# Patient Record
Sex: FEMALE | Race: WHITE | NOT HISPANIC OR LATINO | Employment: FULL TIME | URBAN - METROPOLITAN AREA
[De-identification: names, ages, dates, MRNs, and addresses within clinical notes are randomized per-mention and may not be internally consistent; named-entity substitution may affect disease eponyms.]

---

## 2023-10-26 ENCOUNTER — HOSPITAL ENCOUNTER (OUTPATIENT)
Dept: RADIOLOGY | Facility: HOSPITAL | Age: 18
Discharge: HOME | End: 2023-10-26
Attending: FAMILY MEDICINE
Payer: COMMERCIAL

## 2023-10-26 ENCOUNTER — OFFICE VISIT (OUTPATIENT)
Dept: INTERNAL MEDICINE | Facility: CLINIC | Age: 18
End: 2023-10-26
Payer: COMMERCIAL

## 2023-10-26 VITALS
HEART RATE: 95 BPM | DIASTOLIC BLOOD PRESSURE: 62 MMHG | HEIGHT: 67 IN | BODY MASS INDEX: 23.36 KG/M2 | TEMPERATURE: 98 F | SYSTOLIC BLOOD PRESSURE: 100 MMHG | WEIGHT: 148.81 LBS | OXYGEN SATURATION: 99 %

## 2023-10-26 DIAGNOSIS — G89.29 CHRONIC BACK PAIN, UNSPECIFIED BACK LOCATION, UNSPECIFIED BACK PAIN LATERALITY: ICD-10-CM

## 2023-10-26 DIAGNOSIS — M54.9 CHRONIC BACK PAIN, UNSPECIFIED BACK LOCATION, UNSPECIFIED BACK PAIN LATERALITY: ICD-10-CM

## 2023-10-26 DIAGNOSIS — E55.9 VITAMIN D DEFICIENCY: ICD-10-CM

## 2023-10-26 DIAGNOSIS — G89.29 CHRONIC BACK PAIN, UNSPECIFIED BACK LOCATION, UNSPECIFIED BACK PAIN LATERALITY: Primary | ICD-10-CM

## 2023-10-26 DIAGNOSIS — M79.2 NEURALGIA: ICD-10-CM

## 2023-10-26 DIAGNOSIS — M54.9 CHRONIC BACK PAIN, UNSPECIFIED BACK LOCATION, UNSPECIFIED BACK PAIN LATERALITY: Primary | ICD-10-CM

## 2023-10-26 DIAGNOSIS — R10.9 STOMACH CRAMPS: ICD-10-CM

## 2023-10-26 PROCEDURE — 72100 XR LUMBAR SPINE 2 OR 3 VIEWS: ICD-10-PCS | Mod: 26,,, | Performed by: RADIOLOGY

## 2023-10-26 PROCEDURE — 99204 PR OFFICE/OUTPT VISIT, NEW, LEVL IV, 45-59 MIN: ICD-10-PCS | Mod: S$GLB,,, | Performed by: FAMILY MEDICINE

## 2023-10-26 PROCEDURE — 1160F RVW MEDS BY RX/DR IN RCRD: CPT | Mod: CPTII,S$GLB,, | Performed by: FAMILY MEDICINE

## 2023-10-26 PROCEDURE — 72100 X-RAY EXAM L-S SPINE 2/3 VWS: CPT | Mod: 26,,, | Performed by: RADIOLOGY

## 2023-10-26 PROCEDURE — 72100 X-RAY EXAM L-S SPINE 2/3 VWS: CPT | Mod: TC,FY,PO

## 2023-10-26 PROCEDURE — 72040 X-RAY EXAM NECK SPINE 2-3 VW: CPT | Mod: 26,,, | Performed by: RADIOLOGY

## 2023-10-26 PROCEDURE — 72070 X-RAY EXAM THORAC SPINE 2VWS: CPT | Mod: 26,,, | Performed by: RADIOLOGY

## 2023-10-26 PROCEDURE — 72040 X-RAY EXAM NECK SPINE 2-3 VW: CPT | Mod: TC,FY,PO

## 2023-10-26 PROCEDURE — 1159F MED LIST DOCD IN RCRD: CPT | Mod: CPTII,S$GLB,, | Performed by: FAMILY MEDICINE

## 2023-10-26 PROCEDURE — 99204 OFFICE O/P NEW MOD 45 MIN: CPT | Mod: S$GLB,,, | Performed by: FAMILY MEDICINE

## 2023-10-26 PROCEDURE — 72070 X-RAY EXAM THORAC SPINE 2VWS: CPT | Mod: TC,FY,PO

## 2023-10-26 PROCEDURE — 99999 PR PBB SHADOW E&M-NEW PATIENT-LVL V: ICD-10-PCS | Mod: PBBFAC,,, | Performed by: FAMILY MEDICINE

## 2023-10-26 PROCEDURE — 3008F PR BODY MASS INDEX (BMI) DOCUMENTED: ICD-10-PCS | Mod: CPTII,S$GLB,, | Performed by: FAMILY MEDICINE

## 2023-10-26 PROCEDURE — 99999 PR PBB SHADOW E&M-NEW PATIENT-LVL V: CPT | Mod: PBBFAC,,, | Performed by: FAMILY MEDICINE

## 2023-10-26 PROCEDURE — 3078F PR MOST RECENT DIASTOLIC BLOOD PRESSURE < 80 MM HG: ICD-10-PCS | Mod: CPTII,S$GLB,, | Performed by: FAMILY MEDICINE

## 2023-10-26 PROCEDURE — 3078F DIAST BP <80 MM HG: CPT | Mod: CPTII,S$GLB,, | Performed by: FAMILY MEDICINE

## 2023-10-26 PROCEDURE — 3008F BODY MASS INDEX DOCD: CPT | Mod: CPTII,S$GLB,, | Performed by: FAMILY MEDICINE

## 2023-10-26 PROCEDURE — 3074F SYST BP LT 130 MM HG: CPT | Mod: CPTII,S$GLB,, | Performed by: FAMILY MEDICINE

## 2023-10-26 PROCEDURE — 3074F PR MOST RECENT SYSTOLIC BLOOD PRESSURE < 130 MM HG: ICD-10-PCS | Mod: CPTII,S$GLB,, | Performed by: FAMILY MEDICINE

## 2023-10-26 PROCEDURE — 1159F PR MEDICATION LIST DOCUMENTED IN MEDICAL RECORD: ICD-10-PCS | Mod: CPTII,S$GLB,, | Performed by: FAMILY MEDICINE

## 2023-10-26 PROCEDURE — 1160F PR REVIEW ALL MEDS BY PRESCRIBER/CLIN PHARMACIST DOCUMENTED: ICD-10-PCS | Mod: CPTII,S$GLB,, | Performed by: FAMILY MEDICINE

## 2023-10-26 PROCEDURE — 72040 XR CERVICAL SPINE 2 OR 3 VIEWS: ICD-10-PCS | Mod: 26,,, | Performed by: RADIOLOGY

## 2023-10-26 PROCEDURE — 72070 XR THORACIC SPINE AP LATERAL: ICD-10-PCS | Mod: 26,,, | Performed by: RADIOLOGY

## 2023-10-26 RX ORDER — LISDEXAMFETAMINE DIMESYLATE 40 MG/1
40 CAPSULE ORAL EVERY MORNING
COMMUNITY
Start: 2023-10-13

## 2023-10-26 RX ORDER — BUPROPION HYDROCHLORIDE 300 MG/1
300 TABLET ORAL DAILY
COMMUNITY

## 2023-10-26 RX ORDER — DICYCLOMINE HYDROCHLORIDE 20 MG/1
20 TABLET ORAL EVERY 6 HOURS PRN
Qty: 30 TABLET | Refills: 0 | Status: SHIPPED | OUTPATIENT
Start: 2023-10-26 | End: 2024-02-04

## 2023-10-26 RX ORDER — TRAZODONE HYDROCHLORIDE 50 MG/1
50 TABLET ORAL NIGHTLY
COMMUNITY

## 2023-10-26 RX ORDER — SERTRALINE HYDROCHLORIDE 100 MG/1
100 TABLET, FILM COATED ORAL DAILY
COMMUNITY

## 2023-10-26 RX ORDER — HYDROXYZINE PAMOATE 50 MG/1
50 CAPSULE ORAL 4 TIMES DAILY
COMMUNITY

## 2023-10-26 NOTE — PROGRESS NOTES
Subjective     Patient ID: Tsering Kan is a 18 y.o. adult.    Chief Complaint: Establish Care    Patient presents to establish care. Patient with complaints of tingling and numbness in legs and hands and frequent lower back pain with sitting for prolonged periods of time for several weeks. Patient states changing positions helps to relieve pain. Tingling in hand and toes and numbness to mid thigh and forearm to hand.  Patient also complains of achy stomach pain and problems with constipation and diarrhea.  Patient does drive 45 min to work and 35 min to home 5 days a week.  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 Patient reports history of vitamin d def.      Review of Systems   Constitutional: Negative.    HENT: Negative.     Eyes:  Negative for discharge and redness.   Respiratory: Negative.     Cardiovascular: Negative.  Negative for chest pain, palpitations and leg swelling.   Gastrointestinal:  Positive for abdominal pain, constipation and diarrhea.   Musculoskeletal:  Positive for back pain. Negative for arthralgias and gait problem.   Neurological:  Positive for numbness. Negative for coordination difficulties.   Psychiatric/Behavioral: Negative.            Objective     Physical Exam  Vitals and nursing note reviewed.   Constitutional:       Appearance: Normal appearance. Tsering Kan is normal weight.   HENT:      Head: Normocephalic and atraumatic.   Eyes:      Extraocular Movements: Extraocular movements intact.      Conjunctiva/sclera: Conjunctivae normal.   Cardiovascular:      Rate and Rhythm: Normal rate and regular rhythm.      Pulses: Normal pulses.      Heart sounds: Normal heart sounds.   Pulmonary:      Effort: Pulmonary effort is normal.      Breath sounds: Normal breath sounds.   Abdominal:      General: Abdomen is flat. Bowel sounds are normal.      Palpations: Abdomen is soft.   Musculoskeletal:         General: Normal range of motion.      Cervical back:  Normal range of motion and neck supple.   Skin:     General: Skin is warm and dry.   Neurological:      General: No focal deficit present.      Mental Status: Tsering Kan is alert and oriented to person, place, and time.   Psychiatric:         Mood and Affect: Mood normal.         Behavior: Behavior normal.            Assessment and Plan     1. Chronic back pain, unspecified back location, unspecified back pain laterality  Comments:  xrays to eval and start physical therapy to help with symptoms, also recommended wearing a supportive bra to help with pain  Orders:  -     X-Ray Lumbar Spine 2 Or 3 Views; Future; Expected date: 10/26/2023  -     X-Ray Cervical Spine 2 or 3 Views; Future; Expected date: 10/26/2023  -     Cancel: X-Ray Thoracic Spine 4 or more views; Future; Expected date: 10/26/2023  -     Ambulatory referral/consult to Physical/Occupational Therapy; Future; Expected date: 11/02/2023    2. Neuralgia  Comments:  will check labs to rule out def, recommend physical therapy to help with symptosm  Orders:  -     CBC W/ AUTO DIFFERENTIAL; Future; Expected date: 10/26/2023  -     COMPREHENSIVE METABOLIC PANEL; Future; Expected date: 10/26/2023  -     IRON AND TIBC; Future; Expected date: 10/26/2023  -     FERRITIN; Future; Expected date: 10/26/2023  -     VITAMIN B12; Future; Expected date: 10/26/2023  -     TSH; Future; Expected date: 10/26/2023  -     T4, free; Future; Expected date: 10/26/2023  -     Ambulatory referral/consult to Physical/Occupational Therapy; Future; Expected date: 11/02/2023    3. Vitamin D deficiency  Comments:  will check levels to assess need for supplementation  Orders:  -     Calcitriol; Future; Expected date: 10/26/2023    4. Stomach cramps  Comments:  dicyclomine prn for cramping pain  Orders:  -     dicyclomine (BENTYL) 20 mg tablet; Take 1 tablet (20 mg total) by mouth every 6 (six) hours as needed (stomach pain).  Dispense: 30 tablet; Refill: 0                 Follow  up in about 6 weeks (around 12/7/2023).

## 2023-11-13 ENCOUNTER — CLINICAL SUPPORT (OUTPATIENT)
Dept: REHABILITATION | Facility: HOSPITAL | Age: 18
End: 2023-11-13
Payer: COMMERCIAL

## 2023-11-13 DIAGNOSIS — M54.9 CHRONIC BACK PAIN, UNSPECIFIED BACK LOCATION, UNSPECIFIED BACK PAIN LATERALITY: ICD-10-CM

## 2023-11-13 DIAGNOSIS — M79.2 NEURALGIA: ICD-10-CM

## 2023-11-13 DIAGNOSIS — G89.29 CHRONIC BACK PAIN, UNSPECIFIED BACK LOCATION, UNSPECIFIED BACK PAIN LATERALITY: ICD-10-CM

## 2023-11-13 DIAGNOSIS — R29.898 WEAKNESS OF BOTH LOWER EXTREMITIES: Primary | ICD-10-CM

## 2023-11-13 PROCEDURE — 97161 PT EVAL LOW COMPLEX 20 MIN: CPT | Mod: PN

## 2023-11-13 PROCEDURE — 97110 THERAPEUTIC EXERCISES: CPT | Mod: PN

## 2023-11-13 NOTE — PLAN OF CARE
[facility]  Physical Therapy Initial Evaluation     Date: 11/13/2023   Name: Tsering Sun [facility] Number: [id number]    Therapy Diagnosis:   Encounter Diagnoses   Name Primary?    Chronic back pain, unspecified back location, unspecified back pain laterality     Neuralgia     Weakness of both lower extremities Yes     Physician: Urban Vickers*    Physician Orders: PT Eval and Treat  Medical Diagnosis from Referral:   M54.9,G89.29 (ICD-10-CM) - Chronic back pain, unspecified back location, unspecified back pain laterality   M79.2 (ICD-10-CM) - Neuralgia   Evaluation Date: 11/13/2023  Authorization Period Expiration: 10/25/2024  Plan of Care Expiration: 1/13/2024  Progress Note Due: 12/13/2023  Visit # / Visits authorized: 1/1   FOTO: 1/3     Time In: 8:00am  Time Out: 8:45am  Total Appointment Time (timed & untimed codes): 45 minutes    Precautions: Standard    Surgery: None    SUBJECTIVE   Date of onset: 3 months   History of current condition - Tsering reports: Having low back pain for around 3 months. Patient reports that the commute to work is about an hour and that is around when the back started to act up. Patient reports the back pain is worse when bending down to lift things up. Patient reports any position for extended periods usually flare up the back. Patient reports the symptoms will go down both legs.     Imaging, x-ray: reviewed     Prior Therapy: none  Social History: lives with their family  Occupation: [employer]; [employer]   Prior Level of Function: walking, climbing, lifting  Current Level of Function: light lifting     Pain:  Current 2/10, worst 7/10, best 2/10   Location: bilateral low back and down into legs   Description: Aching, Dull, and Shooting  Aggravating Factors: Sitting, Standing, Bending, Walking, and Lifting  Easing Factors: rest    Pts goals: decrease pain      Medical History:   Past Medical History:   Diagnosis Date    ADHD  (attention deficit hyperactivity disorder)     Anxiety     Depression      Surgical History:   Tsering Kan  has no past surgical history on file.    Medications:   Tsering has a current medication list which includes the following prescription(s): bupropion, dicyclomine, hydroxyzine pamoate, lisdexamfetamine, sertraline, and trazodone.    Allergies:   Review of patient's allergies indicates:  No Known Allergies     OBJECTIVE     Sensation:  Sensation is intact to light touch    (WFL: Within Functional Limits)     ROM   Right (degrees) Left (degrees)   Lumbar Flexion  100% 100%   Lumbar Extension 100% pain 100% pain   Lumbar Sidebending 100% 100%   Lumbar Rotation 100% 100% pain      Hip Flexion (125) WFL WFL   Hip Extension (15) WFL WFL   Hip Internal Rotation (45) WFL WFL   Hip External Rotation (45) WFL WFL   Hip Abduction (45) WFL WFL     Joint Mobility   Right Left    Lumbar PA Glide Normal Normal   Lumbar Rotation Normal Normal   Hip Distraction Normal Normal   Hip Inferior Glide Normal Normal   Hip Medial Glide Normal Normal   Hip Lateral Glide Normal Normal   Posterior Innominate Rotation Normal Normal   Anterior Innominate Rotation Normal Normal   Innominate IR  Normal Normal   Innominate ER  Normal Normal     Strength   Right    Left   Gluteus Chuy 4/5 4/5   Gluteus Medius 4-/5 4-/5   Hip Adductors 4/5 4/5   Psoas 4/5 4/5   Quadriceps 4+/5 4+/5   Hamstrings 4+/5 4+/5   Anterior Tibialis 5/5 5/5   Gastroc/Soleus 5/5 5/5   Transverse Abdominis good     Special Tests:   Test Right Left   SLR Test positive positive   SLUMP  positive positive   ASIS Compression negative negative   ASIS Distraction negative negative   Posterior Shear Gap negative negative     Muscle Length: decreased hamstring length bilaterally     Palpation: tender at bilateral lumbar erectors and bilateral piriformis      Movement Analysis:   Test Right Left   Squat Pain   Heel Tap/Step Up Pain Pain   Single Leg Balance Pain Pain      Gait Analysis: The patient ambulated with the use of none and presents with the following gait abnormalities:  normal    Other: Pt presents with postural abnormalities which include: forward head and rounded shoulders     Function:     Intake Outcome Measure for FOTO Lumbar Spine Survey    Therapist reviewed FOTO scores for Tsering Kan on 11/13/2023.   FOTO documents entered into [employer] - see Media section.    Intake Score: 54%       TREATMENT     Total Treatment time (time-based codes) separate from Evaluation: 15 minutes     Tsering received the treatments listed below:      therapeutic exercises to develop strength, endurance, ROM, flexibility, posture, and core stabilization for 15 minutes including:  HEP education     manual therapy techniques: Joint mobilizations, Myofacial release, and Soft tissue Mobilization were applied to the: low back for 0 minutes, including:    neuromuscular re-education activities to improve: Balance, Coordination, Proprioception, and Posture for 0 minutes. The following activities were included:    therapeutic activities to improve functional performance for 0 minutes, including:    PATIENT EDUCATION AND HOME EXERCISES     Education provided:   - HEP education     Written Home Exercises Provided: yes. Exercises were reviewed and Tsering was able to demonstrate them prior to the end of the session.  Tsering demonstrated good  understanding of the education provided. See EMR under Patient Instructions for exercises provided during therapy sessions.    ASSESSMENT   Tsering is a 18 y.o. adult referred to outpatient Physical Therapy with a medical diagnosis of   M54.9,G89.29 (ICD-10-CM) - Chronic back pain, unspecified back location, unspecified back pain laterality   M79.2 (ICD-10-CM) - Neuralgia   . The patient presents with impairments which include impairments list: ROM, strength, endurance, muscle length, balance, posture, core strength and stability, functional movement patterns,  and coordination.  These impairments are limiting patient's ability to stand for long periods, sit for long periods, drive long distances for commute, walk around campus, go up and down stairs, and perform heavy tasks at work and home. Pt prognosis is Excellent due to personal factors and co-morbidities listed below. Pt will benefit from skilled outpatient Physical Therapy to address the deficits stated above and in the chart below, provide pt/family education, and to maximize pt's level of independence.     Plan of care discussed with patient: Yes  Pt's spiritual, cultural and educational needs considered and patient is agreeable to the plan of care and goals as stated below:     Anticipated Barriers for therapy: school and work schedule     Medical Necessity is demonstrated by the following  History  Co-morbidities and personal factors that may impact the plan of care [x] LOW: no personal factors / co-morbidities  [] MODERATE: 1-2 personal factors / co-morbidities  [] HIGH: 3+ personal factors / co-morbidities    Moderate / High Support Documentation:   Co-morbidities affecting plan of care: excessive commute time/distance and young age    Personal Factors:   age  coping style     Examination  Body Structures and Functions, activity limitations and participation restrictions that may impact the plan of care [x] LOW: addressing 1-2 elements  [] MODERATE: 3+ elements  [] HIGH: 4+ elements (please support below)    Moderate / High Support Documentation:     Participation Restrictions:   ADLs  Work  Exercise    Mobility  lifting and carrying objects  walking    Self care  no deficits    Life Areas  school education  employment    Community and Social Life  community life  recreation and leisure     Clinical Presentation [x] LOW: stable  [] MODERATE: Evolving  [] HIGH: Unstable     Decision Making/ Complexity Score: low       Goals:  Short Term Goals: 4 weeks   1. Recent signs and systems trend is improving in order to  progress towards LTG's.  2. Patient will improve all lumbar ROM to 100% in order to work fully.  3. Patient will be independent with HEP in order to further progress and return to maximal function.  4. Pain rating at Worst: 3/10 in order to progress towards increased independence with activity.    Long Term Goals: 8 weeks   1. Patient will improve psoas strength to 5/5 in order to walk long distances on campus.  2. Patient will improve glute med strength to 4+/5 in order to go up and down stairs.  3. Patient will self report improvement to 73% on the FOTO Low Back Survey.     PLAN   Plan of care Certification: 11/13/2023 to 1/13/2024.    Outpatient Physical Therapy 2 times weekly for 8 weeks to include the following interventions: Gait Training, Manual Therapy, Moist Heat/ Ice, Neuromuscular Re-ed, Patient Education, Self Care, Therapeutic Activities, Therapeutic Exercise, and Functional Dry Needling .     Jersey Springer, PT, DPT    I CERTIFY THE NEED FOR THESE SERVICES FURNISHED UNDER THIS PLAN OF TREATMENT AND WHILE UNDER MY CARE   Physician's comments:     Physician's Signature: ___________________________________________________

## 2023-11-20 ENCOUNTER — CLINICAL SUPPORT (OUTPATIENT)
Dept: REHABILITATION | Facility: HOSPITAL | Age: 18
End: 2023-11-20
Payer: COMMERCIAL

## 2023-11-20 DIAGNOSIS — G89.29 CHRONIC BILATERAL LOW BACK PAIN, UNSPECIFIED WHETHER SCIATICA PRESENT: Primary | ICD-10-CM

## 2023-11-20 DIAGNOSIS — M54.50 CHRONIC BILATERAL LOW BACK PAIN, UNSPECIFIED WHETHER SCIATICA PRESENT: Primary | ICD-10-CM

## 2023-11-20 DIAGNOSIS — R29.898 WEAKNESS OF BOTH LOWER EXTREMITIES: ICD-10-CM

## 2023-11-20 PROCEDURE — 97140 MANUAL THERAPY 1/> REGIONS: CPT | Mod: PN

## 2023-11-20 PROCEDURE — 97530 THERAPEUTIC ACTIVITIES: CPT | Mod: PN

## 2023-11-20 PROCEDURE — 97110 THERAPEUTIC EXERCISES: CPT | Mod: PN

## 2023-11-20 PROCEDURE — 97112 NEUROMUSCULAR REEDUCATION: CPT | Mod: PN

## 2023-11-20 NOTE — PROGRESS NOTES
[facility]   Physical Therapy Treatment Note     Name: Tsering Kan  Clinic Number: [id number]    Therapy Diagnosis:   Encounter Diagnoses   Name Primary?    Chronic bilateral low back pain, unspecified whether sciatica present Yes    Weakness of both lower extremities      Physician: Urban Vickers*    Visit Date: 11/20/2023    Physician Orders: PT Eval and Treat  Medical Diagnosis from Referral:   M54.9,G89.29 (ICD-10-CM) - Chronic back pain, unspecified back location, unspecified back pain laterality   M79.2 (ICD-10-CM) - Neuralgia   Evaluation Date: 11/13/2023  Authorization Period Expiration: 12/31/2023  Plan of Care Expiration: 1/13/2024  Progress Note Due: 12/13/2023  Visit # / Visits authorized: 1/20 (+1 eval)  FOTO: 1/3     Time In: 1:30pm  Time Out: 2:30pm  Total Billable Time: 50 minutes    Precautions: Standard    SUBJECTIVE     Pt reports: Feeling okay today. HEP was okay so far.   Tsering Kan was compliant with home exercise program.  Response to previous treatment: good  Functional change: none noted     Pain:  Current 2/10, worst 7/10, best 2/10   Location: bilateral low back and down into legs     OBJECTIVE     Objective Measures updated at progress report unless specified.     TREATMENT     Tsering received the treatments listed below:      therapeutic exercises to develop strength, endurance, ROM, flexibility, posture, and core stabilization for 15 minutes including:  Bike 5 minutes  Piriformis Stretch [id number]  LTR 30x  Lumbar flexion stretch with swiss ball [id number]     manual therapy techniques: Joint mobilizations, Myofacial release, and Soft tissue Mobilization were applied to the: low back for 10 minutes, including:  Cupping to Lumbar erectors      neuromuscular re-education activities to improve: Balance, Coordination, Proprioception, and Posture for 15 minutes. The following activities were included:  Bridges 3x10  Sidelying Clams  3x10  Reverse Clams with ball 3x10  Bird/Dog 2x10  Hamstring Curls with swiss ball 30x     therapeutic activities to improve functional performance for 10 minutes, including:  Lateral Heel Taps 3x10  Single Leg RDLs to cone 3x10     PATIENT EDUCATION AND HOME EXERCISES      Education provided:   - HEP education      Written Home Exercises Provided: yes. Exercises were reviewed and Tsering was able to demonstrate them prior to the end of the session.  Tsering demonstrated good  understanding of the education provided. See EMR under Patient Instructions for exercises provided during therapy sessions.    ASSESSMENT     New movements were added today to work on hip and core control. Cupping and soft tissue were performed to the lumbar spine tro decrease tension. Overall, patient tolerated session well.      Tsering Is progressing well towards Tsering Kan's goals.   Pt prognosis is Good.     Pt will continue to benefit from skilled outpatient physical therapy to address the deficits listed in the problem list box on initial evaluation, provide pt/family education and to maximize pt's level of independence in the home and community environment.     Pt's spiritual, cultural and educational needs considered and pt agreeable to plan of care and goals.     Anticipated barriers to physical therapy: school and work schedule      Goals:  Short Term Goals: 4 weeks   1. Recent signs and systems trend is improving in order to progress towards LTG's.  2. Patient will improve all lumbar ROM to 100% in order to work fully.  3. Patient will be independent with HEP in order to further progress and return to maximal function.  4. Pain rating at Worst: 3/10 in order to progress towards increased independence with activity.     Long Term Goals: 8 weeks   1. Patient will improve psoas strength to 5/5 in order to walk long distances on campus.  2. Patient will improve glute med strength to 4+/5 in order to go up and down stairs.  3.  Patient will self report improvement to 73% on the FOTO Low Back Survey.     PLAN     Continue with physical therapy as planned.     Plan of care Certification: 11/13/2023 to 1/13/2024.     Jersey Springer, PT, DPT

## 2023-11-27 ENCOUNTER — CLINICAL SUPPORT (OUTPATIENT)
Dept: REHABILITATION | Facility: HOSPITAL | Age: 18
End: 2023-11-27
Payer: COMMERCIAL

## 2023-11-27 DIAGNOSIS — M54.50 CHRONIC BILATERAL LOW BACK PAIN, UNSPECIFIED WHETHER SCIATICA PRESENT: Primary | ICD-10-CM

## 2023-11-27 DIAGNOSIS — G89.29 CHRONIC BILATERAL LOW BACK PAIN, UNSPECIFIED WHETHER SCIATICA PRESENT: Primary | ICD-10-CM

## 2023-11-27 DIAGNOSIS — R29.898 WEAKNESS OF BOTH LOWER EXTREMITIES: ICD-10-CM

## 2023-11-27 PROCEDURE — 97112 NEUROMUSCULAR REEDUCATION: CPT | Mod: PN

## 2023-11-27 PROCEDURE — 97530 THERAPEUTIC ACTIVITIES: CPT | Mod: PN

## 2023-11-27 PROCEDURE — 97110 THERAPEUTIC EXERCISES: CPT | Mod: PN

## 2023-11-27 NOTE — PROGRESS NOTES
[facility]   Physical Therapy Treatment Note     Name: Tsering Kan  Clinic Number: [id number]    Therapy Diagnosis:   Encounter Diagnoses   Name Primary?    Chronic bilateral low back pain, unspecified whether sciatica present Yes    Weakness of both lower extremities      Physician: Urban Vickers*    Visit Date: 11/27/2023    Physician Orders: PT Eval and Treat  Medical Diagnosis from Referral:   M54.9,G89.29 (ICD-10-CM) - Chronic back pain, unspecified back location, unspecified back pain laterality   M79.2 (ICD-10-CM) - Neuralgia   Evaluation Date: 11/13/2023  Authorization Period Expiration: 12/31/2023  Plan of Care Expiration: 1/13/2024  Progress Note Due: 12/13/2023  Visit # / Visits authorized: 2/20 (+1 eval)  FOTO: 1/3     Time In: 1:30pm  Time Out: 2:20pm  Total Billable Time: 50 minutes    Precautions: Standard    SUBJECTIVE     Pt reports: The back felt a little looser after last session and feels alright today.   Tsering Kan was compliant with home exercise program.  Response to previous treatment: good  Functional change: none noted     Pain:  Current 2/10, worst 7/10, best 2/10   Location: bilateral low back and down into legs     OBJECTIVE     Objective Measures updated at progress report unless specified.     TREATMENT     Tsering received the treatments listed below:      therapeutic exercises to develop strength, endurance, ROM, flexibility, posture, and core stabilization for 15 minutes including:  Bike 5 minutes  Piriformis Stretch [id number]  LTR 30x  Lumbar flexion stretch with swiss ball [id number]     manual therapy techniques: Joint mobilizations, Myofacial release, and Soft tissue Mobilization were applied to the: low back for 0 minutes, including:  Cupping to Lumbar erectors      neuromuscular re-education activities to improve: Balance, Coordination, Proprioception, and Posture for 25 minutes. The following activities were included:  Bridges  3x10  Sidelying Clams 3x10  Reverse Clams with ball 3x10  Bird/Dog 2x10  Hamstring Curls with swiss ball 30x  Hip 3 way on airex 10x each   Trunk Rot Isometric 3x5 7#      therapeutic activities to improve functional performance for 10 minutes, including:  Lateral Heel Taps 3x10  Single Leg RDLs to cone 3x10     PATIENT EDUCATION AND HOME EXERCISES      Education provided:   - HEP education      Written Home Exercises Provided: yes. Exercises were reviewed and Tsering was able to demonstrate them prior to the end of the session.  Tsering demonstrated good  understanding of the education provided. See EMR under Patient Instructions for exercises provided during therapy sessions.    ASSESSMENT     Hip 3 way was added today to work on hip control. Overall, patient tolerated session well. Tsering was advised to keep working on HEP daily.     Tsering Is progressing well towards Tsering Kan's goals.   Pt prognosis is Good.     Pt will continue to benefit from skilled outpatient physical therapy to address the deficits listed in the problem list box on initial evaluation, provide pt/family education and to maximize pt's level of independence in the home and community environment.     Pt's spiritual, cultural and educational needs considered and pt agreeable to plan of care and goals.     Anticipated barriers to physical therapy: school and work schedule      Goals:  Short Term Goals: 4 weeks   1. Recent signs and systems trend is improving in order to progress towards LTG's.  2. Patient will improve all lumbar ROM to 100% in order to work fully.  3. Patient will be independent with HEP in order to further progress and return to maximal function.  4. Pain rating at Worst: 3/10 in order to progress towards increased independence with activity.     Long Term Goals: 8 weeks   1. Patient will improve psoas strength to 5/5 in order to walk long distances on campus.  2. Patient will improve glute med strength to 4+/5 in order to  go up and down stairs.  3. Patient will self report improvement to 73% on the FOTO Low Back Survey.     PLAN     Continue with physical therapy as planned.     Plan of care Certification: 11/13/2023 to 1/13/2024.     Jersey Springer, PT, DPT

## 2023-11-30 ENCOUNTER — CLINICAL SUPPORT (OUTPATIENT)
Dept: REHABILITATION | Facility: HOSPITAL | Age: 18
End: 2023-11-30
Payer: COMMERCIAL

## 2023-11-30 DIAGNOSIS — R29.898 WEAKNESS OF BOTH LOWER EXTREMITIES: Primary | ICD-10-CM

## 2023-11-30 PROCEDURE — 97110 THERAPEUTIC EXERCISES: CPT | Mod: PN,CQ

## 2023-11-30 PROCEDURE — 97112 NEUROMUSCULAR REEDUCATION: CPT | Mod: PN,CQ

## 2023-11-30 PROCEDURE — 97530 THERAPEUTIC ACTIVITIES: CPT | Mod: PN,CQ

## 2023-11-30 NOTE — PROGRESS NOTES
[facility]   Physical Therapy Treatment Note     Name: Tsering Kan  Clinic Number: [id number]    Therapy Diagnosis:   Encounter Diagnosis   Name Primary?    Weakness of both lower extremities Yes     Physician: Urban Vickers*    Visit Date: 11/30/2023    Physician Orders: PT Eval and Treat  Medical Diagnosis from Referral:   M54.9,G89.29 (ICD-10-CM) - Chronic back pain, unspecified back location, unspecified back pain laterality   M79.2 (ICD-10-CM) - Neuralgia   Evaluation Date: 11/13/2023  Authorization Period Expiration: 12/31/2023  Plan of Care Expiration: 1/13/2024  Progress Note Due: 12/13/2023  Visit # / Visits authorized: 3/20(+1 eval)  FOTO: 1/3     Time In: 1:00pm  Time Out: 1:55pm  Total Billable Time: 55 minutes    Precautions: Standard    SUBJECTIVE     Pt reports: feeling okay lately. States pain after sitting in class and then having to drive for the hour commute.   Tsering Kan was compliant with home exercise program.  Response to previous treatment: good  Functional change: none noted     Pain:  Current 2/10, worst 7/10, best 2/10   Location: bilateral low back and down into legs     OBJECTIVE     Objective Measures updated at progress report unless specified.     TREATMENT     Tsering received the treatments listed below:      therapeutic exercises to develop strength, endurance, ROM, flexibility, posture, and core stabilization for 15 minutes including:  Bike 5 minutes  Piriformis Stretch [id number]  LTR 30x  Lumbar flexion stretch with swiss ball [id number]     manual therapy techniques: Joint mobilizations, Myofacial release, and Soft tissue Mobilization were applied to the: low back for 0 minutes, including:  Cupping to Lumbar erectors      neuromuscular re-education activities to improve: Balance, Coordination, Proprioception, and Posture for 30 minutes. The following activities were included:  Bridges 3x10  Sidelying Clams 3x10  Reverse Clams with  ball 3x10  Bird/Dog 3x10  Hamstring Curls with swiss ball 30x  Hip 3 way on airex 10x each   Trunk Rot Isometric 3x5 7#      therapeutic activities to improve functional performance for 10 minutes, including:  Lateral Heel Taps 3x10  Single Leg RDLs to cone 3x10     PATIENT EDUCATION AND HOME EXERCISES      Education provided:   - HEP education      Written Home Exercises Provided: yes. Exercises were reviewed and Tsering was able to demonstrate them prior to the end of the session.  Tsering demonstrated good  understanding of the education provided. See EMR under Patient Instructions for exercises provided during therapy sessions.    ASSESSMENT     Heavy verbal cues for slow and controlled motions throughout session today. Patient with difficulty with static stability exercises, good awareness with necessary core control. Patient left session with good fatigue and minimal soreness.     Tsering Is progressing well towards Tsering Kan's goals.   Pt prognosis is Good.     Pt will continue to benefit from skilled outpatient physical therapy to address the deficits listed in the problem list box on initial evaluation, provide pt/family education and to maximize pt's level of independence in the home and community environment.     Pt's spiritual, cultural and educational needs considered and pt agreeable to plan of care and goals.     Anticipated barriers to physical therapy: school and work schedule      Goals:  Short Term Goals: 4 weeks   1. Recent signs and systems trend is improving in order to progress towards LTG's.  2. Patient will improve all lumbar ROM to 100% in order to work fully.  3. Patient will be independent with HEP in order to further progress and return to maximal function.  4. Pain rating at Worst: 3/10 in order to progress towards increased independence with activity.     Long Term Goals: 8 weeks   1. Patient will improve psoas strength to 5/5 in order to walk long distances on campus.  2. Patient  will improve glute med strength to 4+/5 in order to go up and down stairs.  3. Patient will self report improvement to 73% on the FOTO Low Back Survey.     PLAN     Continue with physical therapy as planned.     Plan of care Certification: 11/13/2023 to 1/13/2024.     Erica Cohen, PTA

## 2023-12-04 ENCOUNTER — CLINICAL SUPPORT (OUTPATIENT)
Dept: REHABILITATION | Facility: HOSPITAL | Age: 18
End: 2023-12-04
Payer: COMMERCIAL

## 2023-12-04 DIAGNOSIS — R29.898 WEAKNESS OF BOTH LOWER EXTREMITIES: ICD-10-CM

## 2023-12-04 DIAGNOSIS — G89.29 CHRONIC BILATERAL LOW BACK PAIN, UNSPECIFIED WHETHER SCIATICA PRESENT: Primary | ICD-10-CM

## 2023-12-04 DIAGNOSIS — M54.50 CHRONIC BILATERAL LOW BACK PAIN, UNSPECIFIED WHETHER SCIATICA PRESENT: Primary | ICD-10-CM

## 2023-12-04 PROCEDURE — 97112 NEUROMUSCULAR REEDUCATION: CPT | Mod: PN,CQ

## 2023-12-04 PROCEDURE — 97140 MANUAL THERAPY 1/> REGIONS: CPT | Mod: PN,CQ

## 2023-12-04 PROCEDURE — 97110 THERAPEUTIC EXERCISES: CPT | Mod: PN,CQ

## 2023-12-04 NOTE — PROGRESS NOTES
[facility]   Physical Therapy Treatment Note     Name: Tsering Kan  Clinic Number: [id number]    Therapy Diagnosis:   Encounter Diagnoses   Name Primary?    Chronic bilateral low back pain, unspecified whether sciatica present Yes    Weakness of both lower extremities      Physician: Urban Vickers*    Visit Date: 12/4/2023    Physician Orders: PT Eval and Treat  Medical Diagnosis from Referral:   M54.9,G89.29 (ICD-10-CM) - Chronic back pain, unspecified back location, unspecified back pain laterality   M79.2 (ICD-10-CM) - Neuralgia   Evaluation Date: 11/13/2023  Authorization Period Expiration: 12/31/2023  Plan of Care Expiration: 1/13/2024  Progress Note Due: 12/13/2023  Visit # / Visits authorized: 4/20(+1 eval)  FOTO: 1/3     Time In: 1:30pm  Time Out: 2:15pm  Total Billable Time: 45 minutes    Precautions: Standard    SUBJECTIVE     Pt reports: hurts more when she is driving or sitting for a while.   Tsering Kan was compliant with home exercise program.  Response to previous treatment: good  Functional change: none noted     Pain:  Current 2/10, worst 7/10, best 2/10   Location: bilateral low back and down into legs     OBJECTIVE     Objective Measures updated at progress report unless specified.     TREATMENT     Tsering received the treatments listed below:      therapeutic exercises to develop strength, endurance, ROM, flexibility, posture, and core stabilization for 15 minutes including:  Bike 5 minutes  Piriformis Stretch [id number]  LTR 30x  Lumbar flexion stretch with swiss ball [id number]     manual therapy techniques: Joint mobilizations, Myofacial release, and Soft tissue Mobilization were applied to the: low back for 10 minutes, including:  Sciatic nerve stretch and glide  Cupping to Lumbar erectors      neuromuscular re-education activities to improve: Balance, Coordination, Proprioception, and Posture for 20 minutes. The following activities were  included:  Bridges 3x10  Sidelying Clams 3x10  Reverse Clams with ball 3x10  Bird/Dog 3x10  Paloff Press, 7#, 2x10  Resisted side stepping w/ paloff press, 7#, 3 laps  Hamstring Curls with swiss ball 30x  Hip 3 way on airex 10x each   Trunk Rot Isometric 3x5 7#      therapeutic activities to improve functional performance for 10 minutes, including:  Lateral Heel Taps 3x10  Single Leg RDLs to cone 3x10     PATIENT EDUCATION AND HOME EXERCISES      Education provided:   - HEP education      Written Home Exercises Provided: yes. Exercises were reviewed and Tsering was able to demonstrate them prior to the end of the session.  Tsering demonstrated good  understanding of the education provided. See EMR under Patient Instructions for exercises provided during therapy sessions.    ASSESSMENT     Needs much more lumbar stability strength. Continued with strengthening and ROM interventions. Added in sciatic nerve stretch and glides.     Tsering Is progressing well towards Tsering Kan's goals.   Pt prognosis is Good.     Pt will continue to benefit from skilled outpatient physical therapy to address the deficits listed in the problem list box on initial evaluation, provide pt/family education and to maximize pt's level of independence in the home and community environment.     Pt's spiritual, cultural and educational needs considered and pt agreeable to plan of care and goals.     Anticipated barriers to physical therapy: school and work schedule      Goals:  Short Term Goals: 4 weeks   1. Recent signs and systems trend is improving in order to progress towards LTG's.  2. Patient will improve all lumbar ROM to 100% in order to work fully.  3. Patient will be independent with HEP in order to further progress and return to maximal function.  4. Pain rating at Worst: 3/10 in order to progress towards increased independence with activity.     Long Term Goals: 8 weeks   1. Patient will improve psoas strength to 5/5 in order to  walk long distances on campus.  2. Patient will improve glute med strength to 4+/5 in order to go up and down stairs.  3. Patient will self report improvement to 73% on the FOTO Low Back Survey.     PLAN     Continue with physical therapy as planned.     Plan of care Certification: 11/13/2023 to 1/13/2024.     Fredo Larsen, PTA

## 2023-12-07 ENCOUNTER — CLINICAL SUPPORT (OUTPATIENT)
Dept: REHABILITATION | Facility: HOSPITAL | Age: 18
End: 2023-12-07
Payer: COMMERCIAL

## 2023-12-07 DIAGNOSIS — M54.50 CHRONIC BILATERAL LOW BACK PAIN, UNSPECIFIED WHETHER SCIATICA PRESENT: Primary | ICD-10-CM

## 2023-12-07 DIAGNOSIS — G89.29 CHRONIC BILATERAL LOW BACK PAIN, UNSPECIFIED WHETHER SCIATICA PRESENT: Primary | ICD-10-CM

## 2023-12-07 DIAGNOSIS — R29.898 WEAKNESS OF BOTH LOWER EXTREMITIES: ICD-10-CM

## 2023-12-07 PROCEDURE — 97112 NEUROMUSCULAR REEDUCATION: CPT | Mod: PN,CQ

## 2023-12-07 PROCEDURE — 97110 THERAPEUTIC EXERCISES: CPT | Mod: PN,CQ

## 2023-12-07 NOTE — PROGRESS NOTES
[facility]   Physical Therapy Treatment Note     Name: Tsering Kan  Clinic Number: [id number]    Therapy Diagnosis:   Encounter Diagnoses   Name Primary?    Chronic bilateral low back pain, unspecified whether sciatica present Yes    Weakness of both lower extremities      Physician: Urban Vickers*    Visit Date: 12/7/2023    Physician Orders: PT Eval and Treat  Medical Diagnosis from Referral:   M54.9,G89.29 (ICD-10-CM) - Chronic back pain, unspecified back location, unspecified back pain laterality   M79.2 (ICD-10-CM) - Neuralgia   Evaluation Date: 11/13/2023  Authorization Period Expiration: 12/31/2023  Plan of Care Expiration: 1/13/2024  Progress Note Due: 12/13/2023  Visit # / Visits authorized: 4/20(+1 eval)  FOTO: 1/3     Time In: 1:45pm  Time Out: 2:30pm  Total Billable Time: 45 minutes    Precautions: Standard    SUBJECTIVE     Pt reports: was a little sore after last therapy session.   Tsering Kan was compliant with home exercise program.  Response to previous treatment: little soreness.  Functional change: none noted     Pain:  Current 2/10, worst 7/10, best 2/10   Location: bilateral low back and down into legs     OBJECTIVE     Objective Measures updated at progress report unless specified.     TREATMENT     Tsering received the treatments listed below:      therapeutic exercises to develop strength, endurance, ROM, flexibility, posture, and core stabilization for 15 minutes including:  Bike 5 minutes  Piriformis Stretch [id number]  LTR 30x  Gastroc stretch on slant board, 3x1'  Lumbar flexion stretch with swiss ball [id number]     manual therapy techniques: Joint mobilizations, Myofacial release, and Soft tissue Mobilization were applied to the: low back for 0 minutes, including:  Sciatic nerve stretch and glide  Cupping to Lumbar erectors      neuromuscular re-education activities to improve: Balance, Coordination, Proprioception, and Posture for 30  "minutes. The following activities were included:  Bridges on SB 3x10  Sidelying Clams 3x10  Reverse Clams with ball 3x10  Bird/Dog 2x10  Paloff Press, 7#, 2x10  Resisted side stepping w/ paloff press, 7#, 3 laps  Hamstring Curls with swiss ball 30x  Hip 3 way on airex 2x10  Reverse crunches, 2x10, 5" holds  Repeated Sit to stands w/ yellow weighted ball, 3x10  Trunk Rot Isometric 3x5 7#      therapeutic activities to improve functional performance for 0 minutes, including:  Lateral Heel Taps 3x10  Single Leg RDLs to cone 3x10     PATIENT EDUCATION AND HOME EXERCISES      Education provided:   - HEP education      Written Home Exercises Provided: yes. Exercises were reviewed and Tsering was able to demonstrate them prior to the end of the session.  Tsering demonstrated good  understanding of the education provided. See EMR under Patient Instructions for exercises provided during therapy sessions.    ASSESSMENT     Continued with strengthening and ROM interventions with emphasis on TA and multifidi strengthening.    Tsering Is progressing well towards Tsering Kan's goals.   Pt prognosis is Good.     Pt will continue to benefit from skilled outpatient physical therapy to address the deficits listed in the problem list box on initial evaluation, provide pt/family education and to maximize pt's level of independence in the home and community environment.     Pt's spiritual, cultural and educational needs considered and pt agreeable to plan of care and goals.     Anticipated barriers to physical therapy: school and work schedule      Goals:  Short Term Goals: 4 weeks   1. Recent signs and systems trend is improving in order to progress towards LTG's.  2. Patient will improve all lumbar ROM to 100% in order to work fully.  3. Patient will be independent with HEP in order to further progress and return to maximal function.  4. Pain rating at Worst: 3/10 in order to progress towards increased independence with activity.   "   Long Term Goals: 8 weeks   1. Patient will improve psoas strength to 5/5 in order to walk long distances on campus.  2. Patient will improve glute med strength to 4+/5 in order to go up and down stairs.  3. Patient will self report improvement to 73% on the FOTO Low Back Survey.     PLAN     Continue with physical therapy as planned.     Plan of care Certification: 11/13/2023 to 1/13/2024.     Fredo Larsen, PTA

## 2023-12-11 ENCOUNTER — CLINICAL SUPPORT (OUTPATIENT)
Dept: REHABILITATION | Facility: HOSPITAL | Age: 18
End: 2023-12-11
Payer: COMMERCIAL

## 2023-12-11 DIAGNOSIS — G89.29 CHRONIC BILATERAL LOW BACK PAIN, UNSPECIFIED WHETHER SCIATICA PRESENT: Primary | ICD-10-CM

## 2023-12-11 DIAGNOSIS — M54.50 CHRONIC BILATERAL LOW BACK PAIN, UNSPECIFIED WHETHER SCIATICA PRESENT: Primary | ICD-10-CM

## 2023-12-11 DIAGNOSIS — R29.898 WEAKNESS OF BOTH LOWER EXTREMITIES: ICD-10-CM

## 2023-12-11 PROCEDURE — 97112 NEUROMUSCULAR REEDUCATION: CPT | Mod: PN

## 2023-12-11 PROCEDURE — 97110 THERAPEUTIC EXERCISES: CPT | Mod: PN

## 2023-12-11 PROCEDURE — 97530 THERAPEUTIC ACTIVITIES: CPT | Mod: PN

## 2023-12-11 NOTE — PROGRESS NOTES
[facility]   Physical Therapy Treatment Note     Name: Tsering Kan  Clinic Number: [id number]    Therapy Diagnosis:   Encounter Diagnoses   Name Primary?    Chronic bilateral low back pain, unspecified whether sciatica present Yes    Weakness of both lower extremities      Physician: Urban Vickers*    Visit Date: 12/11/2023    Physician Orders: PT Eval and Treat  Medical Diagnosis from Referral:   M54.9,G89.29 (ICD-10-CM) - Chronic back pain, unspecified back location, unspecified back pain laterality   M79.2 (ICD-10-CM) - Neuralgia   Evaluation Date: 11/13/2023  Authorization Period Expiration: 12/31/2023  Plan of Care Expiration: 1/13/2024  Progress Note Due: 12/13/2023  Visit # / Visits authorized: 5/20(+1 eval)  FOTO: 1/3     Time In: 1:30pm  Time Out: 2:20pm  Total Billable Time: 50 minutes    Precautions: Standard    SUBJECTIVE     Pt reports: The left side has a little discomfort from a long car ride.   Tsering Kan was compliant with home exercise program.  Response to previous treatment: little soreness.  Functional change: none noted     Pain:  Current 2/10, worst 7/10, best 2/10   Location: bilateral low back and down into legs     OBJECTIVE     Objective Measures updated at progress report unless specified.     TREATMENT     Tsering received the treatments listed below:      therapeutic exercises to develop strength, endurance, ROM, flexibility, posture, and core stabilization for 15 minutes including:  Bike 5 minutes  Piriformis Stretch [id number]  LTR 30x  Gastroc stretch on slant board, 3x1'  Lumbar flexion stretch with swiss ball [id number]     manual therapy techniques: Joint mobilizations, Myofacial release, and Soft tissue Mobilization were applied to the: low back for 0 minutes, including:  Sciatic nerve stretch and glide  Cupping to Lumbar erectors      neuromuscular re-education activities to improve: Balance, Coordination, Proprioception, and  "Posture for 25 minutes. The following activities were included:  Bridges on SB 3x10  Sidelying Clams 3x10  Reverse Clams with ball 3x10  Bird/Dog 2x10  Paloff Press, 7#, 2x10  Resisted side stepping w/ paloff press, 7#, 3 laps  Hamstring Curls with swiss ball 30x  Hip 3 way on airex 2x10  Reverse crunches, 2x10, 5" holds  Foam Rolling glutes     therapeutic activities to improve functional performance for 10 minutes, including:  Lateral Heel Taps 3x10  Single Leg RDLs to cone 3x10  Repeated Sit to stands w/ yellow weighted ball, 3x10     PATIENT EDUCATION AND HOME EXERCISES      Education provided:   - HEP education      Written Home Exercises Provided: yes. Exercises were reviewed and Tsering was able to demonstrate them prior to the end of the session.  Tsering demonstrated good  understanding of the education provided. See EMR under Patient Instructions for exercises provided during therapy sessions.    ASSESSMENT     Patient has some back pain with bird dogs today but tolerated all other movements well. Foam rolling was performed to teach patient how to self mobilize glutes. Patient tolerated today's session well.     Tsering Is progressing well towards Tsering Kan's goals.   Pt prognosis is Good.     Pt will continue to benefit from skilled outpatient physical therapy to address the deficits listed in the problem list box on initial evaluation, provide pt/family education and to maximize pt's level of independence in the home and community environment.     Pt's spiritual, cultural and educational needs considered and pt agreeable to plan of care and goals.     Anticipated barriers to physical therapy: school and work schedule      Goals:  Short Term Goals: 4 weeks   1. Recent signs and systems trend is improving in order to progress towards LTG's.  2. Patient will improve all lumbar ROM to 100% in order to work fully.  3. Patient will be independent with HEP in order to further progress and return to maximal " function.  4. Pain rating at Worst: 3/10 in order to progress towards increased independence with activity.     Long Term Goals: 8 weeks   1. Patient will improve psoas strength to 5/5 in order to walk long distances on campus.  2. Patient will improve glute med strength to 4+/5 in order to go up and down stairs.  3. Patient will self report improvement to 73% on the FOTO Low Back Survey.     PLAN     Continue with physical therapy as planned.     Plan of care Certification: 11/13/2023 to 1/13/2024.     Jersey Springer, PT, DPT

## 2023-12-14 ENCOUNTER — CLINICAL SUPPORT (OUTPATIENT)
Dept: REHABILITATION | Facility: HOSPITAL | Age: 18
End: 2023-12-14
Payer: COMMERCIAL

## 2023-12-14 DIAGNOSIS — R29.898 WEAKNESS OF BOTH LOWER EXTREMITIES: ICD-10-CM

## 2023-12-14 DIAGNOSIS — M54.50 CHRONIC BILATERAL LOW BACK PAIN, UNSPECIFIED WHETHER SCIATICA PRESENT: Primary | ICD-10-CM

## 2023-12-14 DIAGNOSIS — G89.29 CHRONIC BILATERAL LOW BACK PAIN, UNSPECIFIED WHETHER SCIATICA PRESENT: Primary | ICD-10-CM

## 2023-12-14 PROCEDURE — 97110 THERAPEUTIC EXERCISES: CPT | Mod: PN

## 2023-12-14 PROCEDURE — 97112 NEUROMUSCULAR REEDUCATION: CPT | Mod: PN

## 2023-12-14 PROCEDURE — 97530 THERAPEUTIC ACTIVITIES: CPT | Mod: PN

## 2023-12-14 NOTE — PROGRESS NOTES
[facility]   Physical Therapy Treatment Note     Name: Tsering Kan  Clinic Number: [id number]    Therapy Diagnosis:   Encounter Diagnoses   Name Primary?    Chronic bilateral low back pain, unspecified whether sciatica present Yes    Weakness of both lower extremities      Physician: Urban Vickers*    Visit Date: 12/14/2023    Physician Orders: PT Eval and Treat  Medical Diagnosis from Referral:   M54.9,G89.29 (ICD-10-CM) - Chronic back pain, unspecified back location, unspecified back pain laterality   M79.2 (ICD-10-CM) - Neuralgia   Evaluation Date: 11/13/2023  Authorization Period Expiration: 12/31/2023  Plan of Care Expiration: 1/13/2024  Progress Note Due: 12/13/2023  Visit # / Visits authorized: 7/20(+1 eval)  FOTO: 1/3     Time In: 2:00pm  Time Out: 3:00pm  Total Billable Time: 50 minutes    Precautions: Standard    SUBJECTIVE     Pt reports: Little sore after last sessio but feels good today.   Tsering Kan was compliant with home exercise program.  Response to previous treatment: little soreness.  Functional change: none noted     Pain:  Current 2/10, worst 7/10, best 2/10   Location: bilateral low back and down into legs     OBJECTIVE     Objective Measures updated at progress report unless specified.     TREATMENT     Tsering received the treatments listed below:      therapeutic exercises to develop strength, endurance, ROM, flexibility, posture, and core stabilization for 15 minutes including:  Bike 5 minutes  Piriformis Stretch [id number]  LTR 30x  Gastroc stretch on slant board, 3x1'  Lumbar flexion stretch with swiss ball [id number]     manual therapy techniques: Joint mobilizations, Myofacial release, and Soft tissue Mobilization were applied to the: low back for 0 minutes, including:  Sciatic nerve stretch and glide  Cupping to Lumbar erectors      neuromuscular re-education activities to improve: Balance, Coordination, Proprioception, and Posture for 25  "minutes. The following activities were included:  Bridges on SB 3x10  Sidelying Clams 3x10  Reverse Clams with ball 3x10  Bird/Dog 2x10  Paloff Press, 7#, 2x10  Resisted side stepping w/ paloff press, 7#, 3 laps  Hamstring Curls with swiss ball 30x  Hip 3 way on airex 2x10  Reverse crunches, 2x10, 5" holds  Foam Rolling glutes     therapeutic activities to improve functional performance for 10 minutes, including:  Lateral Heel Taps 3x10  Single Leg RDLs to cone 3x10  Repeated Sit to stands w/ yellow weighted ball, 3x10     PATIENT EDUCATION AND HOME EXERCISES      Education provided:   - HEP education      Written Home Exercises Provided: yes. Exercises were reviewed and Tsering was able to demonstrate them prior to the end of the session.  Tsering demonstrated good  understanding of the education provided. See EMR under Patient Instructions for exercises provided during therapy sessions.    ASSESSMENT     Patient tolerated session well. Tsering was advised to keep working on HEP daily. Some discomfort was reported in the eft hip during stretching that was slightly different than the right.     Tsering Is progressing well towards Tsering Kan's goals.   Pt prognosis is Good.     Pt will continue to benefit from skilled outpatient physical therapy to address the deficits listed in the problem list box on initial evaluation, provide pt/family education and to maximize pt's level of independence in the home and community environment.     Pt's spiritual, cultural and educational needs considered and pt agreeable to plan of care and goals.     Anticipated barriers to physical therapy: school and work schedule      Goals:  Short Term Goals: 4 weeks   1. Recent signs and systems trend is improving in order to progress towards LTG's.  2. Patient will improve all lumbar ROM to 100% in order to work fully.  3. Patient will be independent with HEP in order to further progress and return to maximal function.  4. Pain rating at " Worst: 3/10 in order to progress towards increased independence with activity.     Long Term Goals: 8 weeks   1. Patient will improve psoas strength to 5/5 in order to walk long distances on campus.  2. Patient will improve glute med strength to 4+/5 in order to go up and down stairs.  3. Patient will self report improvement to 73% on the FOTO Low Back Survey.     PLAN     Continue with physical therapy as planned.     Plan of care Certification: 11/13/2023 to 1/13/2024.     Jersey Springer, PT, DPT

## 2024-01-02 ENCOUNTER — HOSPITAL ENCOUNTER (OUTPATIENT)
Dept: RADIOLOGY | Facility: HOSPITAL | Age: 19
Discharge: HOME | End: 2024-01-02
Attending: FAMILY MEDICINE
Payer: COMMERCIAL

## 2024-01-02 ENCOUNTER — OFFICE VISIT (OUTPATIENT)
Dept: INTERNAL MEDICINE | Facility: CLINIC | Age: 19
End: 2024-01-02
Payer: COMMERCIAL

## 2024-01-02 VITALS
WEIGHT: 157.44 LBS | DIASTOLIC BLOOD PRESSURE: 72 MMHG | SYSTOLIC BLOOD PRESSURE: 106 MMHG | BODY MASS INDEX: 24.71 KG/M2 | HEART RATE: 109 BPM | TEMPERATURE: 97 F | OXYGEN SATURATION: 99 % | HEIGHT: 67 IN

## 2024-01-02 DIAGNOSIS — M25.562 CHRONIC PAIN OF BOTH KNEES: ICD-10-CM

## 2024-01-02 DIAGNOSIS — M25.562 CHRONIC PAIN OF BOTH KNEES: Chronic | ICD-10-CM

## 2024-01-02 DIAGNOSIS — Z13.31 POSITIVE DEPRESSION SCREENING: ICD-10-CM

## 2024-01-02 DIAGNOSIS — G89.29 CHRONIC PAIN OF BOTH KNEES: ICD-10-CM

## 2024-01-02 DIAGNOSIS — M54.9 CHRONIC BACK PAIN, UNSPECIFIED BACK LOCATION, UNSPECIFIED BACK PAIN LATERALITY: Primary | Chronic | ICD-10-CM

## 2024-01-02 DIAGNOSIS — M25.561 CHRONIC PAIN OF BOTH KNEES: Chronic | ICD-10-CM

## 2024-01-02 DIAGNOSIS — G89.29 CHRONIC BACK PAIN, UNSPECIFIED BACK LOCATION, UNSPECIFIED BACK PAIN LATERALITY: Primary | Chronic | ICD-10-CM

## 2024-01-02 DIAGNOSIS — R10.9 STOMACH CRAMPS: ICD-10-CM

## 2024-01-02 DIAGNOSIS — G89.29 CHRONIC PAIN OF BOTH KNEES: Chronic | ICD-10-CM

## 2024-01-02 DIAGNOSIS — F33.1 MODERATE EPISODE OF RECURRENT MAJOR DEPRESSIVE DISORDER: Chronic | ICD-10-CM

## 2024-01-02 DIAGNOSIS — M25.561 CHRONIC PAIN OF BOTH KNEES: ICD-10-CM

## 2024-01-02 PROCEDURE — 99999 PR PBB SHADOW E&M-EST. PATIENT-LVL IV: CPT | Mod: PBBFAC,,, | Performed by: FAMILY MEDICINE

## 2024-01-02 PROCEDURE — 99214 OFFICE O/P EST MOD 30 MIN: CPT | Mod: S$GLB,,, | Performed by: FAMILY MEDICINE

## 2024-01-02 PROCEDURE — 1160F RVW MEDS BY RX/DR IN RCRD: CPT | Mod: CPTII,S$GLB,, | Performed by: FAMILY MEDICINE

## 2024-01-02 PROCEDURE — 3078F DIAST BP <80 MM HG: CPT | Mod: CPTII,S$GLB,, | Performed by: FAMILY MEDICINE

## 2024-01-02 PROCEDURE — 73562 X-RAY EXAM OF KNEE 3: CPT | Mod: TC,50,FY,PO

## 2024-01-02 PROCEDURE — 1159F MED LIST DOCD IN RCRD: CPT | Mod: CPTII,S$GLB,, | Performed by: FAMILY MEDICINE

## 2024-01-02 PROCEDURE — 73562 X-RAY EXAM OF KNEE 3: CPT | Mod: 26,,, | Performed by: RADIOLOGY

## 2024-01-02 PROCEDURE — 3008F BODY MASS INDEX DOCD: CPT | Mod: CPTII,S$GLB,, | Performed by: FAMILY MEDICINE

## 2024-01-02 PROCEDURE — 3074F SYST BP LT 130 MM HG: CPT | Mod: CPTII,S$GLB,, | Performed by: FAMILY MEDICINE

## 2024-01-02 NOTE — PROGRESS NOTES
Subjective     Patient ID: Tsering Kan is a 18 y.o. adult.    Chief Complaint: Follow-up (6 wks)    Patient presents to follow up. She reports did not take bentyl as much due to constipation.  She reports she is still having issues with back and knee pain. She reports she has started binding and she is not driving as much because she lives closer to home. No improvement with physical therapy.      Review of Systems   Constitutional: Negative.    HENT: Negative.     Eyes: Negative.    Respiratory: Negative.     Cardiovascular: Negative.    Gastrointestinal: Negative.    Musculoskeletal:  Positive for arthralgias and back pain.   Neurological: Negative.    Psychiatric/Behavioral: Negative.            Objective     Physical Exam  Vitals and nursing note reviewed.   Constitutional:       Appearance: Normal appearance.   HENT:      Head: Normocephalic and atraumatic.   Eyes:      Extraocular Movements: Extraocular movements intact.   Cardiovascular:      Rate and Rhythm: Normal rate and regular rhythm.   Pulmonary:      Effort: Pulmonary effort is normal.      Breath sounds: Normal breath sounds.   Skin:     General: Skin is warm and dry.   Neurological:      General: No focal deficit present.      Mental Status: Tsering Kan is alert and oriented to person, place, and time.   Psychiatric:         Mood and Affect: Mood normal.         Behavior: Behavior normal.            Assessment and Plan     1. Chronic back pain, unspecified back location, unspecified back pain laterality  -     Ambulatory referral/consult to Orthopedics; Future; Expected date: 01/09/2024    2. Chronic pain of both knees  -     X-ray Knee Ortho Bilateral; Future; Expected date: 01/02/2024    3. Moderate episode of recurrent major depressive disorder  Comments:  stable and controlled on wellbutrin    4. Positive depression screening  Comments:  I have reviewed the positive depression score which warrants active treatment with  psychotherapy and/or medications.    5. Stomach cramps  Comments:  have improved since last visit      Will refer to ortho for back and knee pain since no improvement with lifestyle changes and therapy           Follow up in about 3 months (around 4/2/2024).

## 2024-02-27 ENCOUNTER — OFFICE VISIT (OUTPATIENT)
Dept: PAIN MEDICINE | Facility: CLINIC | Age: 19
End: 2024-02-27
Payer: COMMERCIAL

## 2024-02-27 VITALS
BODY MASS INDEX: 24.12 KG/M2 | DIASTOLIC BLOOD PRESSURE: 70 MMHG | SYSTOLIC BLOOD PRESSURE: 110 MMHG | WEIGHT: 153.69 LBS | RESPIRATION RATE: 16 BRPM | HEIGHT: 67 IN

## 2024-02-27 DIAGNOSIS — M47.816 LUMBAR SPONDYLOSIS: ICD-10-CM

## 2024-02-27 DIAGNOSIS — M51.36 DDD (DEGENERATIVE DISC DISEASE), LUMBAR: ICD-10-CM

## 2024-02-27 DIAGNOSIS — M54.16 LUMBAR RADICULOPATHY, CHRONIC: ICD-10-CM

## 2024-02-27 DIAGNOSIS — M54.9 DORSALGIA, UNSPECIFIED: ICD-10-CM

## 2024-02-27 DIAGNOSIS — M54.16 LUMBAR RADICULOPATHY: Primary | ICD-10-CM

## 2024-02-27 DIAGNOSIS — M43.16 SPONDYLOLISTHESIS OF LUMBAR REGION: ICD-10-CM

## 2024-02-27 PROCEDURE — 99204 OFFICE O/P NEW MOD 45 MIN: CPT | Mod: S$GLB,,, | Performed by: ANESTHESIOLOGY

## 2024-02-27 PROCEDURE — 3008F BODY MASS INDEX DOCD: CPT | Mod: CPTII,S$GLB,, | Performed by: ANESTHESIOLOGY

## 2024-02-27 PROCEDURE — 99999 PR PBB SHADOW E&M-EST. PATIENT-LVL III: CPT | Mod: PBBFAC,,, | Performed by: ANESTHESIOLOGY

## 2024-02-27 PROCEDURE — 1159F MED LIST DOCD IN RCRD: CPT | Mod: CPTII,S$GLB,, | Performed by: ANESTHESIOLOGY

## 2024-02-27 PROCEDURE — 3074F SYST BP LT 130 MM HG: CPT | Mod: CPTII,S$GLB,, | Performed by: ANESTHESIOLOGY

## 2024-02-27 PROCEDURE — 3078F DIAST BP <80 MM HG: CPT | Mod: CPTII,S$GLB,, | Performed by: ANESTHESIOLOGY

## 2024-02-27 RX ORDER — MELOXICAM 15 MG/1
15 TABLET ORAL DAILY PRN
Qty: 30 TABLET | Refills: 1 | Status: SHIPPED | OUTPATIENT
Start: 2024-02-27

## 2024-02-27 NOTE — PROGRESS NOTES
New Patient Interventional Pain Note (Initial Visit)    Referring Physician: Urban Vickers*    PCP: Jeannie Vickers MD    Chief Complaint:     Chief Complaint   Patient presents with    Low-back Pain     Patient has pain in lower back and right hip.  Pain level 4/10        SUBJECTIVE:    Tsering Kan is a 18 y.o. adult who presents to the clinic for the evaluation of lower back pain.   Patient reports 8 month history of lower back pain.  Patient reports that pain likely started after starting a longer commute.  Patient denies any previous surgical intervention on the lumbar spine.  Lower back pain described as a throbbing aching pain primarily starts in the right lower back area.  This pain will intermittently radiate to the right buttocks and right proximal thigh.  Patient reports intermittent left lower back pain, reports that the right lower back pain is her primary pain.  Pain is typically worse with prolonged sitting and driving, better with stretching and anti-inflammatories.  Pain is currently rated a 4/10, but increase to an 8/10 with exacerbating activities. Denies any fevers, chills, changes in gait, saddle anesthesia, or bowel and bladder incontinence      Non-Pharmacologic Treatments:  Physical Therapy/Home Exercise: yes  Ice/Heat:yes  TENS: no  Acupuncture: no  Massage: yes  Chiropractic: no        Previous Pain Medications:  Tylenol, ibuprofen, topicals     report:  Reviewed and consistent with medication use as prescribed.    Pain Procedures:   none    Pain Disability Index Review:         2/27/2024     3:21 PM   Last 3 PDI Scores   Pain Disability Index (PDI) 56       Imaging:     XR LUMBAR SPINE 2 OR 3 VIEWS 10/26/2023     /   History: Low back pain     FINDINGS: 3 views were obtained of the lumbar spine.     Disc space narrowing at L5-S1.  Grade 1 retrolisthesis of L5 on S1.  No additional disc space narrowing.  No fracture or compression deformity.         Impression:   Disc space narrowing at L5-S1.     Grade 1 retrolisthesis of L5 on S1.       Past Medical History:   Diagnosis Date    ADHD (attention deficit hyperactivity disorder)     Anxiety     Depression      History reviewed. No pertinent surgical history.  Social History     Socioeconomic History    Marital status: Single   Tobacco Use    Smoking status: Former     Types: Cigarettes    Smokeless tobacco: Never     Family History   Problem Relation Age of Onset    Alcohol abuse Father     Diabetes Maternal Grandmother        Review of patient's allergies indicates:  No Known Allergies    Current Outpatient Medications   Medication Sig    buPROPion (WELLBUTRIN XL) 300 MG 24 hr tablet Take 300 mg by mouth once daily.    hydrOXYzine pamoate (VISTARIL) 50 MG Cap Take 50 mg by mouth 4 (four) times daily.    lisdexamfetamine (VYVANSE) 40 MG Cap Take 40 mg by mouth every morning.    sertraline (ZOLOFT) 100 MG tablet Take 100 mg by mouth once daily.    traZODone (DESYREL) 50 MG tablet Take 50 mg by mouth every evening.    meloxicam (MOBIC) 15 MG tablet Take 1 tablet (15 mg total) by mouth daily as needed for Pain (Lower Back).     No current facility-administered medications for this visit.         ROS  Review of Systems   Constitutional:  Negative for chills, diaphoresis, fatigue and fever.   HENT:  Negative for ear discharge, ear pain, rhinorrhea, trouble swallowing and voice change.    Eyes:  Negative for pain and redness.   Respiratory:  Negative for chest tightness, shortness of breath, wheezing and stridor.    Cardiovascular:  Negative for chest pain and leg swelling.   Gastrointestinal:  Negative for blood in stool, diarrhea, nausea and vomiting.   Endocrine: Negative for cold intolerance and heat intolerance.   Genitourinary:  Negative for dysuria, hematuria and urgency.   Musculoskeletal:  Positive for arthralgias, back pain, myalgias, neck pain and neck stiffness. Negative for gait problem and joint  "swelling.   Skin:  Negative for rash.   Neurological:  Positive for weakness and numbness. Negative for tremors, seizures, speech difficulty, light-headedness and headaches.   Hematological:  Does not bruise/bleed easily.   Psychiatric/Behavioral:  Negative for agitation, confusion and suicidal ideas.             OBJECTIVE:  /70   Resp 16   Ht 5' 7" (1.702 m)   Wt 69.7 kg (153 lb 10.6 oz)   LMP 02/26/2024   BMI 24.07 kg/m²         Physical Exam  Constitutional:       General: Tsering Kan is not in acute distress.     Appearance: Normal appearance. Tsering Kan is not ill-appearing.   HENT:      Head: Normocephalic and atraumatic.      Nose: No congestion or rhinorrhea.   Eyes:      Extraocular Movements: Extraocular movements intact.      Pupils: Pupils are equal, round, and reactive to light.   Cardiovascular:      Pulses: Normal pulses.   Pulmonary:      Effort: Pulmonary effort is normal.   Skin:     General: Skin is warm and dry.      Capillary Refill: Capillary refill takes less than 2 seconds.   Neurological:      General: No focal deficit present.      Mental Status: Tsering Kan is alert and oriented to person, place, and time.      Sensory: No sensory deficit.      Motor: Weakness present. No abnormal muscle tone.      Gait: Gait normal.      Deep Tendon Reflexes:      Reflex Scores:       Patellar reflexes are 2+ on the right side and 2+ on the left side.       Achilles reflexes are 1+ on the right side and 2+ on the left side.     Comments: 4/5 strength in right knee extension   Psychiatric:         Mood and Affect: Mood normal.         Behavior: Behavior normal.         Thought Content: Thought content normal.           Musculoskeletal:    Lumbar Exam  Incision: no  Pain with Flexion: yes  Pain with Extension: yes  ROM: FROM  Paraspinous TTP:  Right lumbar paraspinous  Facet TTP:  L5-S1  Facet Loading:  Positive on the right  SLR:  Positive on the right at 75°  SIJ TTP:  " "Negative bilaterally  JACKELYN:  Negative bilaterally      LABS:  Lab Results   Component Value Date    WBC 6.35 10/26/2023    HGB 14.1 10/26/2023    HCT 41.2 10/26/2023    MCV 93 10/26/2023     10/26/2023       CMP  Sodium   Date Value Ref Range Status   10/26/2023 140 136 - 145 mmol/L Final     Potassium   Date Value Ref Range Status   10/26/2023 3.6 3.5 - 5.1 mmol/L Final     Chloride   Date Value Ref Range Status   10/26/2023 107 95 - 110 mmol/L Final     CO2   Date Value Ref Range Status   10/26/2023 20 (L) 23 - 29 mmol/L Final     Glucose   Date Value Ref Range Status   10/26/2023 71 70 - 110 mg/dL Final     BUN   Date Value Ref Range Status   10/26/2023 11 6 - 20 mg/dL Final     Creatinine   Date Value Ref Range Status   10/26/2023 0.8 0.5 - 1.4 mg/dL Final     Calcium   Date Value Ref Range Status   10/26/2023 9.9 8.7 - 10.5 mg/dL Final     Total Protein   Date Value Ref Range Status   10/26/2023 8.1 6.0 - 8.4 g/dL Final     Albumin   Date Value Ref Range Status   10/26/2023 4.8 3.5 - 5.2 g/dL Final     Total Bilirubin   Date Value Ref Range Status   10/26/2023 0.7 0.1 - 1.0 mg/dL Final     Comment:     For infants and newborns, interpretation of results should be based  on gestational age, weight and in agreement with clinical  observations.    Premature Infant recommended reference ranges:  Up to 24 hours.............<8.0 mg/dL  Up to 48 hours............<12.0 mg/dL  3-5 days..................<15.0 mg/dL  6-29 days.................<15.0 mg/dL       Alkaline Phosphatase   Date Value Ref Range Status   10/26/2023 76 55 - 135 U/L Final     AST   Date Value Ref Range Status   10/26/2023 22 10 - 40 U/L Final     ALT   Date Value Ref Range Status   10/26/2023 19 10 - 44 U/L Final     Anion Gap   Date Value Ref Range Status   10/26/2023 13 8 - 16 mmol/L Final       No results found for: "LABA1C", "HGBA1C"          ASSESSMENT:       18 y.o. year old adult with lower back pain, consistent with     1. Lumbar " radiculopathy  MRI Lumbar Spine Without Contrast    meloxicam (MOBIC) 15 MG tablet      2. DDD (degenerative disc disease), lumbar  meloxicam (MOBIC) 15 MG tablet      3. Lumbar spondylosis  meloxicam (MOBIC) 15 MG tablet      4. Spondylolisthesis of lumbar region  meloxicam (MOBIC) 15 MG tablet      5. Dorsalgia, unspecified  meloxicam (MOBIC) 15 MG tablet      6. Lumbar radiculopathy, chronic  meloxicam (MOBIC) 15 MG tablet        Lumbar radiculopathy  -     MRI Lumbar Spine Without Contrast; Future; Expected date: 02/27/2024  -     meloxicam (MOBIC) 15 MG tablet; Take 1 tablet (15 mg total) by mouth daily as needed for Pain (Lower Back).  Dispense: 30 tablet; Refill: 1    DDD (degenerative disc disease), lumbar  -     meloxicam (MOBIC) 15 MG tablet; Take 1 tablet (15 mg total) by mouth daily as needed for Pain (Lower Back).  Dispense: 30 tablet; Refill: 1    Lumbar spondylosis  -     meloxicam (MOBIC) 15 MG tablet; Take 1 tablet (15 mg total) by mouth daily as needed for Pain (Lower Back).  Dispense: 30 tablet; Refill: 1    Spondylolisthesis of lumbar region  -     meloxicam (MOBIC) 15 MG tablet; Take 1 tablet (15 mg total) by mouth daily as needed for Pain (Lower Back).  Dispense: 30 tablet; Refill: 1    Dorsalgia, unspecified  -     meloxicam (MOBIC) 15 MG tablet; Take 1 tablet (15 mg total) by mouth daily as needed for Pain (Lower Back).  Dispense: 30 tablet; Refill: 1    Lumbar radiculopathy, chronic  -     meloxicam (MOBIC) 15 MG tablet; Take 1 tablet (15 mg total) by mouth daily as needed for Pain (Lower Back).  Dispense: 30 tablet; Refill: 1             PLAN:   - Interventions:   None at this time.  Pain appears to be consistent with right lumbar radiculopathy and lumbar discogenic disease.  We will obtain updated imaging to discuss further interventions    - Anticoagulation use:   No no anticoagulation    - Medications:   Start Mobic 15 mg daily as needed    - Therapy:    Patient has completed 8 weeks of  formal physical therapy with mild relief.  Continue home exercises    - Imaging/Diagnostic:   X-rays of lumbar spine reviewed and findings discussed with patient.  Significant for grade 1 retrolisthesis of L5 upon S1 with loss of disc height.   We will obtain updated MRI lumbar spine without contrast to further evaluate lower back pain with right lumbar radiculopathy that is continued despite over 8 weeks conservative therapy    - Consults:   None at this time        - Patient Questions: Answered all of the patient's questions regarding diagnosis, therapy, and treatment     This condition does not require this patient to take time off of work, and the primary goal of our Pain Management services is to improve the patient's functional capacity.     - Follow up visit: return to clinic after MRI        The above plan and management options were discussed at length with patient. Patient is in agreement with the above and verbalized understanding.    I discussed the goals of interventional chronic pain management with the patient on today's visit.  I explained the utility of injections for diagnostic and therapeutic purposes.  We discussed a multimodal approach to pain including treating the patient's given worst pain at any given time.  We will use a systematic approach to addressing pain.  We will also adopt a multimodal approach that includes injections, adjuvant medications, physical therapy, at times psychiatry.  There may be a limited role for opioid use intermittently in the treatment of pain, more particularly for acute pain although no one approach can be used as a sole treatment modality.    I emphasized the importance of regular exercise, core strengthening and stretching, diet and weight loss as a cornerstone of long-term pain management.      Chente Gatica MD  Interventional Pain Management  [facility]    Disclaimer:  This note was prepared using voice recognition system and is likely to have  sound alike errors that may have been overlooked even after proof reading.  Please call me with any questions

## 2024-03-01 ENCOUNTER — HOSPITAL ENCOUNTER (OUTPATIENT)
Dept: RADIOLOGY | Facility: HOSPITAL | Age: 19
Discharge: HOME | End: 2024-03-01
Attending: ANESTHESIOLOGY
Payer: COMMERCIAL

## 2024-03-01 DIAGNOSIS — M54.16 LUMBAR RADICULOPATHY: ICD-10-CM

## 2024-03-01 PROCEDURE — 72148 MRI LUMBAR SPINE W/O DYE: CPT | Mod: TC,PN

## 2024-03-01 PROCEDURE — 72148 MRI LUMBAR SPINE W/O DYE: CPT | Mod: 26,,, | Performed by: STUDENT IN AN ORGANIZED HEALTH CARE EDUCATION/TRAINING PROGRAM

## 2024-03-06 ENCOUNTER — OFFICE VISIT (OUTPATIENT)
Dept: PAIN MEDICINE | Facility: CLINIC | Age: 19
End: 2024-03-06
Payer: COMMERCIAL

## 2024-03-06 DIAGNOSIS — M47.816 LUMBAR SPONDYLOSIS: ICD-10-CM

## 2024-03-06 DIAGNOSIS — M54.16 LUMBAR RADICULOPATHY: Primary | ICD-10-CM

## 2024-03-06 DIAGNOSIS — M51.36 DDD (DEGENERATIVE DISC DISEASE), LUMBAR: ICD-10-CM

## 2024-03-06 PROCEDURE — 99212 OFFICE O/P EST SF 10 MIN: CPT | Mod: 95,,, | Performed by: ANESTHESIOLOGY

## 2024-03-06 NOTE — PROGRESS NOTES
New Patient Interventional Pain Note (Initial Visit)    The patient location is: home  The chief complaint leading to consultation is: chronic pain      Visit type: audiovisual     Face to Face time with patient: 10 minutes  10 minutes of total time spent on the encounter, which includes face to face time and non-face to face time preparing to see the patient (eg, review of tests), Obtaining and/or reviewing separately obtained history, Documenting clinical information in the electronic or other health record, Independently interpreting results (not separately reported) and communicating results to the patient/family/caregiver, or Care coordination (not separately reported).      Each patient to whom he or she provides medical services by telemedicine is:  (1) informed of the relationship between the physician and patient and the respective role of any other health care provider with respect to management of the patient; and (2) notified that he or she may decline to receive medical services by telemedicine and may withdraw from such care at any time.      Referring Physician: No ref. provider found    PCP: Jeannie Vickers MD    Chief Complaint:  Lower Back Pain       SUBJECTIVE:    Interval History (03/06/2024):       Initial HPI:     Tsering Kan is a 18 y.o. adult who presents to the clinic for the evaluation of lower back pain.   Patient reports 8 month history of lower back pain.  Patient reports that pain likely started after starting a longer commute.  Patient denies any previous surgical intervention on the lumbar spine.  Lower back pain described as a throbbing aching pain primarily starts in the right lower back area.  This pain will intermittently radiate to the right buttocks and right proximal thigh.  Patient reports intermittent left lower back pain, reports that the right lower back pain is her primary pain.  Pain is typically worse with prolonged sitting and driving, better with  stretching and anti-inflammatories.  Pain is currently rated a 4/10, but increase to an 8/10 with exacerbating activities. Denies any fevers, chills, changes in gait, saddle anesthesia, or bowel and bladder incontinence      Non-Pharmacologic Treatments:  Physical Therapy/Home Exercise: yes  Ice/Heat:yes  TENS: no  Acupuncture: no  Massage: yes  Chiropractic: no        Previous Pain Medications:  Tylenol, ibuprofen, topicals     report:  Reviewed and consistent with medication use as prescribed.    Pain Procedures:   none    Pain Disability Index Review:         2/27/2024     3:21 PM   Last 3 PDI Scores   Pain Disability Index (PDI) 56       Imaging:     Results for orders placed during the hospital encounter of 03/01/24    MRI Lumbar Spine Without Contrast    Narrative  EXAMINATION:  MRI LUMBAR SPINE WITHOUT CONTRAST    CLINICAL HISTORY:  Low back pain, symptoms persist with > 6wks conservative treatment;Lumbar radiculopathy, symptoms persist with conservative treatment; Radiculopathy, lumbar region    TECHNIQUE:  Multiplanar, multisequence MR images were acquired from the thoracolumbar junction to the sacrum without the administration of contrast.    COMPARISON:  X-ray dated 10/26/2023    FINDINGS:  There are 5 non-rib-bearing lumbar vertebrae.  Alignment is unremarkable with no significant listhesis.  No acute fracture or compression deformity.  No aggressive focal signal abnormality.    Conus medullaris terminates at the L2 level.  Conus medullaris is normal in size and signal.    T12-L1: No significant disc pathology.  No significant spinal canal or neural foraminal stenosis.    L1-L2: No significant disc pathology.  No significant spinal canal or neural foraminal stenosis.    L2-L3: No significant disc pathology.  No significant spinal canal or neural foraminal stenosis.    L3-L4: No significant disc pathology.  No significant spinal canal or neural foraminal stenosis.    L4-L5: No significant disc  pathology.  No significant spinal canal or neural foraminal stenosis.    L5-S1: Disc desiccation and small circumferential bulge with superimposed small central protrusion and annular fissure.  No significant spinal canal or neural foraminal stenosis.    Paraspinal soft tissues are unremarkable.  Visualized intra-abdominal and pelvic contents are also unremarkable.    Impression  Disc desiccation and small bulge with superimposed small central protrusion and annular fissure at L5-S1.    Otherwise unremarkable MR appearance of the lumbar spine.  No significant spinal canal or neural foraminal stenosis.      Electronically signed by: Curly Loyola  Date:    03/01/2024  Time:    11:38      XR LUMBAR SPINE 2 OR 3 VIEWS 10/26/2023     /   History: Low back pain     FINDINGS: 3 views were obtained of the lumbar spine.     Disc space narrowing at L5-S1.  Grade 1 retrolisthesis of L5 on S1.  No additional disc space narrowing.  No fracture or compression deformity.        Impression:   Disc space narrowing at L5-S1.     Grade 1 retrolisthesis of L5 on S1.       Past Medical History:   Diagnosis Date    ADHD (attention deficit hyperactivity disorder)     Anxiety     Depression      No past surgical history on file.  Social History     Socioeconomic History    Marital status: Single   Tobacco Use    Smoking status: Former     Types: Cigarettes    Smokeless tobacco: Never     Family History   Problem Relation Age of Onset    Alcohol abuse Father     Diabetes Maternal Grandmother        Review of patient's allergies indicates:  No Known Allergies    Current Outpatient Medications   Medication Sig    buPROPion (WELLBUTRIN XL) 300 MG 24 hr tablet Take 300 mg by mouth once daily.    hydrOXYzine pamoate (VISTARIL) 50 MG Cap Take 50 mg by mouth 4 (four) times daily.    lisdexamfetamine (VYVANSE) 40 MG Cap Take 40 mg by mouth every morning.    meloxicam (MOBIC) 15 MG tablet Take 1 tablet (15 mg total) by mouth daily as needed for  Pain (Lower Back).    sertraline (ZOLOFT) 100 MG tablet Take 100 mg by mouth once daily.    traZODone (DESYREL) 50 MG tablet Take 50 mg by mouth every evening.     No current facility-administered medications for this visit.         ROS  Review of Systems   Constitutional:  Negative for chills, diaphoresis, fatigue and fever.   Respiratory:  Negative for chest tightness, shortness of breath, wheezing and stridor.    Cardiovascular:  Negative for chest pain and leg swelling.   Gastrointestinal:  Negative for blood in stool, diarrhea, nausea and vomiting.   Endocrine: Negative for cold intolerance and heat intolerance.   Genitourinary:  Negative for dysuria, hematuria and urgency.   Musculoskeletal:  Positive for arthralgias, back pain, myalgias, neck pain and neck stiffness. Negative for gait problem and joint swelling.   Skin:  Negative for rash.   Neurological:  Positive for weakness and numbness. Negative for tremors, seizures, speech difficulty, light-headedness and headaches.   Hematological:  Does not bruise/bleed easily.   Psychiatric/Behavioral:  Negative for agitation, confusion and suicidal ideas.             OBJECTIVE:  [facility] 02/26/2024     Virtual exam, physical exam from previous clinic visit       Physical Exam  Constitutional:       General: Tsering Kan is not in acute distress.     Appearance: Normal appearance. Tsering Kan is not ill-appearing.   HENT:      Head: Normocephalic and atraumatic.      Nose: No congestion or rhinorrhea.   Eyes:      Extraocular Movements: Extraocular movements intact.      Pupils: Pupils are equal, round, and reactive to light.   Cardiovascular:      Pulses: Normal pulses.   Pulmonary:      Effort: Pulmonary effort is normal.   Skin:     General: Skin is warm and dry.      Capillary Refill: Capillary refill takes less than 2 seconds.   Neurological:      General: No focal deficit present.      Mental Status: Tsering Kan is alert and oriented to  person, place, and time.      Sensory: No sensory deficit.      Motor: Weakness present. No abnormal muscle tone.      Gait: Gait normal.      Deep Tendon Reflexes:      Reflex Scores:       Patellar reflexes are 2+ on the right side and 2+ on the left side.       Achilles reflexes are 1+ on the right side and 2+ on the left side.     Comments: 4/5 strength in right knee extension   Psychiatric:         Mood and Affect: Mood normal.         Behavior: Behavior normal.         Thought Content: Thought content normal.           Musculoskeletal:    Lumbar Exam  Incision: no  Pain with Flexion: yes  Pain with Extension: yes  ROM: FROM  Paraspinous TTP:  Right lumbar paraspinous  Facet TTP:  L5-S1  Facet Loading:  Positive on the right  SLR:  Positive on the right at 75°  SIJ TTP:  Negative bilaterally  JACKELYN:  Negative bilaterally      LABS:  Lab Results   Component Value Date    WBC 6.35 10/26/2023    HGB 14.1 10/26/2023    HCT 41.2 10/26/2023    MCV 93 10/26/2023     10/26/2023       CMP  Sodium   Date Value Ref Range Status   10/26/2023 140 136 - 145 mmol/L Final     Potassium   Date Value Ref Range Status   10/26/2023 3.6 3.5 - 5.1 mmol/L Final     Chloride   Date Value Ref Range Status   10/26/2023 107 95 - 110 mmol/L Final     CO2   Date Value Ref Range Status   10/26/2023 20 (L) 23 - 29 mmol/L Final     Glucose   Date Value Ref Range Status   10/26/2023 71 70 - 110 mg/dL Final     BUN   Date Value Ref Range Status   10/26/2023 11 6 - 20 mg/dL Final     Creatinine   Date Value Ref Range Status   10/26/2023 0.8 0.5 - 1.4 mg/dL Final     Calcium   Date Value Ref Range Status   10/26/2023 9.9 8.7 - 10.5 mg/dL Final     Total Protein   Date Value Ref Range Status   10/26/2023 8.1 6.0 - 8.4 g/dL Final     Albumin   Date Value Ref Range Status   10/26/2023 4.8 3.5 - 5.2 g/dL Final     Total Bilirubin   Date Value Ref Range Status   10/26/2023 0.7 0.1 - 1.0 mg/dL Final     Comment:     For infants and newborns,  "interpretation of results should be based  on gestational age, weight and in agreement with clinical  observations.    Premature Infant recommended reference ranges:  Up to 24 hours.............<8.0 mg/dL  Up to 48 hours............<12.0 mg/dL  3-5 days..................<15.0 mg/dL  6-29 days.................<15.0 mg/dL       Alkaline Phosphatase   Date Value Ref Range Status   10/26/2023 76 55 - 135 U/L Final     AST   Date Value Ref Range Status   10/26/2023 22 10 - 40 U/L Final     ALT   Date Value Ref Range Status   10/26/2023 19 10 - 44 U/L Final     Anion Gap   Date Value Ref Range Status   10/26/2023 13 8 - 16 mmol/L Final       No results found for: "LABA1C", "HGBA1C"          ASSESSMENT:       18 y.o. year old adult with lower back pain, consistent with     1. Lumbar radiculopathy        2. DDD (degenerative disc disease), lumbar        3. Lumbar spondylosis            Lumbar radiculopathy    DDD (degenerative disc disease), lumbar    Lumbar spondylosis               PLAN:   - Interventions:   None at this time.  Patient will call if she would like to schedule bilateral L5-S1 transforaminal epidural steroid injection for lumbar radiculopathy.    - Anticoagulation use:   No no anticoagulation    - Medications:   Continue Mobic 15 mg daily as needed    - Therapy:    Patient has completed 8 weeks of formal physical therapy with mild relief.  Continue home exercises    - Imaging/Diagnostic:   MRI lumbar spine reviewed and findings discussed with patient.  Significant for disc desiccation and central protrusion with annular fissure at L5-S1.  No significant pars defect noted.   X-rays of lumbar spine reviewed.  Significant for grade 1 retrolisthesis of L5 upon S1 with loss of disc height.       - Consults:   None at this time        - Patient Questions: Answered all of the patient's questions regarding diagnosis, therapy, and treatment     This condition does not require this patient to take time off of work, " and the primary goal of our Pain Management services is to improve the patient's functional capacity.     - Follow up visit: return to clinic p.r.n.        The above plan and management options were discussed at length with patient. Patient is in agreement with the above and verbalized understanding.    I discussed the goals of interventional chronic pain management with the patient on today's visit.  I explained the utility of injections for diagnostic and therapeutic purposes.  We discussed a multimodal approach to pain including treating the patient's given worst pain at any given time.  We will use a systematic approach to addressing pain.  We will also adopt a multimodal approach that includes injections, adjuvant medications, physical therapy, at times psychiatry.  There may be a limited role for opioid use intermittently in the treatment of pain, more particularly for acute pain although no one approach can be used as a sole treatment modality.    I emphasized the importance of regular exercise, core strengthening and stretching, diet and weight loss as a cornerstone of long-term pain management.      Chente Gatica MD  Interventional Pain Management  [facility]    Disclaimer:  This note was prepared using voice recognition system and is likely to have sound alike errors that may have been overlooked even after proof reading.  Please call me with any questions

## 2024-07-02 ENCOUNTER — OFFICE VISIT (OUTPATIENT)
Dept: INTERNAL MEDICINE | Facility: CLINIC | Age: 19
End: 2024-07-02
Payer: COMMERCIAL

## 2024-07-02 VITALS
OXYGEN SATURATION: 98 % | DIASTOLIC BLOOD PRESSURE: 64 MMHG | TEMPERATURE: 98 F | SYSTOLIC BLOOD PRESSURE: 110 MMHG | HEART RATE: 141 BPM | WEIGHT: 158.94 LBS | HEIGHT: 67 IN | BODY MASS INDEX: 24.94 KG/M2

## 2024-07-02 DIAGNOSIS — G89.29 CHRONIC BACK PAIN, UNSPECIFIED BACK LOCATION, UNSPECIFIED BACK PAIN LATERALITY: Primary | Chronic | ICD-10-CM

## 2024-07-02 DIAGNOSIS — M54.9 CHRONIC BACK PAIN, UNSPECIFIED BACK LOCATION, UNSPECIFIED BACK PAIN LATERALITY: Primary | Chronic | ICD-10-CM

## 2024-07-02 DIAGNOSIS — F41.9 ANXIETY AND DEPRESSION: Chronic | ICD-10-CM

## 2024-07-02 DIAGNOSIS — F32.A ANXIETY AND DEPRESSION: Chronic | ICD-10-CM

## 2024-07-02 PROCEDURE — 3008F BODY MASS INDEX DOCD: CPT | Mod: CPTII,S$GLB,, | Performed by: FAMILY MEDICINE

## 2024-07-02 PROCEDURE — 3078F DIAST BP <80 MM HG: CPT | Mod: CPTII,S$GLB,, | Performed by: FAMILY MEDICINE

## 2024-07-02 PROCEDURE — 1160F RVW MEDS BY RX/DR IN RCRD: CPT | Mod: CPTII,S$GLB,, | Performed by: FAMILY MEDICINE

## 2024-07-02 PROCEDURE — 99999 PR PBB SHADOW E&M-EST. PATIENT-LVL III: CPT | Mod: PBBFAC,,, | Performed by: FAMILY MEDICINE

## 2024-07-02 PROCEDURE — 1159F MED LIST DOCD IN RCRD: CPT | Mod: CPTII,S$GLB,, | Performed by: FAMILY MEDICINE

## 2024-07-02 PROCEDURE — 3074F SYST BP LT 130 MM HG: CPT | Mod: CPTII,S$GLB,, | Performed by: FAMILY MEDICINE

## 2024-07-02 PROCEDURE — 99214 OFFICE O/P EST MOD 30 MIN: CPT | Mod: S$GLB,,, | Performed by: FAMILY MEDICINE

## 2024-07-02 RX ORDER — DOXEPIN HYDROCHLORIDE 50 MG/1
50 CAPSULE ORAL NIGHTLY
COMMUNITY
Start: 2024-06-21

## 2024-07-02 RX ORDER — METHYLPHENIDATE HYDROCHLORIDE 27 MG/1
27 TABLET ORAL EVERY MORNING
COMMUNITY
Start: 2024-06-24

## 2024-07-02 NOTE — PROGRESS NOTES
Subjective     Patient ID: Tsering Kan is a 19 y.o. adult.    Chief Complaint: Follow-up (3 months)    Patient presents to follow up on back. Patient was referred to pain management and has completed 8 weeks of formal physical therapy. Patient had imaging with Dr. Gatica.     - Imaging/Diagnostic:              MRI lumbar spine reviewed and findings discussed with patient.  Significant for disc desiccation and central protrusion with annular fissure at L5-S1.  No significant pars defect noted.              X-rays of lumbar spine reviewed.  Significant for grade 1 retrolisthesis of L5 upon S1 with loss of disc height.      Review of Systems   Constitutional: Negative.    HENT: Negative.     Eyes:  Negative for discharge and redness.   Respiratory: Negative.     Cardiovascular: Negative.  Negative for palpitations and leg swelling.   Gastrointestinal: Negative.  Negative for constipation and diarrhea.   Musculoskeletal: Negative.  Negative for gait problem.   Neurological: Negative.  Negative for coordination difficulties.   Psychiatric/Behavioral: Negative.            Objective     Physical Exam  Vitals and nursing note reviewed.   Constitutional:       Appearance: Normal appearance.   HENT:      Head: Normocephalic and atraumatic.   Eyes:      Extraocular Movements: Extraocular movements intact.      Conjunctiva/sclera: Conjunctivae normal.   Pulmonary:      Effort: Pulmonary effort is normal.   Skin:     General: Skin is warm and dry.   Neurological:      General: No focal deficit present.      Mental Status: Tsering is alert and oriented to person, place, and time.   Psychiatric:         Mood and Affect: Mood normal.         Behavior: Behavior normal.            Assessment and Plan     1. Chronic back pain, unspecified back location, unspecified back pain laterality    2. Anxiety and depression  Comments:  stable on wellbutrin and zoloft following with counseling      Pain is stable. Patient to follow  up with pain management as needed. I recommend her to continue with stretching and exercises she learned at PT. I told patient to reach out to me when she is ready for referral to plastics for breast reduction.         Follow up in about 4 months (around 10/28/2024) for Annual Exam.

## 2024-07-25 ENCOUNTER — TELEPHONE (OUTPATIENT)
Dept: INTERNAL MEDICINE | Facility: CLINIC | Age: 19
End: 2024-07-25
Payer: COMMERCIAL

## 2024-08-16 ENCOUNTER — PATIENT OUTREACH (OUTPATIENT)
Dept: ADMINISTRATIVE | Facility: HOSPITAL | Age: 19
End: 2024-08-16
Payer: COMMERCIAL

## 2024-08-16 NOTE — PROGRESS NOTES
VBHM Score: 1     Cervical Cancer Screening                       Health Maintenance Topic(s) Outreach Outcomes & Actions Taken:    Cervical Cancer Screening - Outreach Outcomes & Actions Taken  : overdue     Additional Notes:  Patient has future appt with PCP already scheduled. Made note to discuss pap smear at visit.            Care Management, Digital Medicine, and/or Education Referrals    OPCM Risk Score: 31.6         Next Steps - Referral Actions: no referrals placed         Additional Notes:

## 2024-10-04 ENCOUNTER — OFFICE VISIT (OUTPATIENT)
Dept: INTERNAL MEDICINE | Facility: CLINIC | Age: 19
End: 2024-10-04
Payer: COMMERCIAL

## 2024-10-04 VITALS
WEIGHT: 141.31 LBS | OXYGEN SATURATION: 97 % | DIASTOLIC BLOOD PRESSURE: 68 MMHG | HEIGHT: 67 IN | BODY MASS INDEX: 22.18 KG/M2 | SYSTOLIC BLOOD PRESSURE: 112 MMHG | HEART RATE: 126 BPM | RESPIRATION RATE: 16 BRPM | TEMPERATURE: 98 F

## 2024-10-04 DIAGNOSIS — Z00.00 ROUTINE GENERAL MEDICAL EXAMINATION AT A HEALTH CARE FACILITY: Primary | ICD-10-CM

## 2024-10-04 DIAGNOSIS — M89.8X1 PAIN OF RIGHT SCAPULA: ICD-10-CM

## 2024-10-04 PROCEDURE — 99999 PR PBB SHADOW E&M-EST. PATIENT-LVL IV: CPT | Mod: PBBFAC,,, | Performed by: FAMILY MEDICINE

## 2024-10-04 PROCEDURE — 3008F BODY MASS INDEX DOCD: CPT | Mod: CPTII,S$GLB,, | Performed by: FAMILY MEDICINE

## 2024-10-04 PROCEDURE — 99395 PREV VISIT EST AGE 18-39: CPT | Mod: S$GLB,,, | Performed by: FAMILY MEDICINE

## 2024-10-04 PROCEDURE — 1160F RVW MEDS BY RX/DR IN RCRD: CPT | Mod: CPTII,S$GLB,, | Performed by: FAMILY MEDICINE

## 2024-10-04 PROCEDURE — 1159F MED LIST DOCD IN RCRD: CPT | Mod: CPTII,S$GLB,, | Performed by: FAMILY MEDICINE

## 2024-10-04 PROCEDURE — 3074F SYST BP LT 130 MM HG: CPT | Mod: CPTII,S$GLB,, | Performed by: FAMILY MEDICINE

## 2024-10-04 PROCEDURE — 3078F DIAST BP <80 MM HG: CPT | Mod: CPTII,S$GLB,, | Performed by: FAMILY MEDICINE

## 2024-10-04 PROCEDURE — 3044F HG A1C LEVEL LT 7.0%: CPT | Mod: CPTII,S$GLB,, | Performed by: FAMILY MEDICINE

## 2024-10-04 RX ORDER — METHYLPHENIDATE HYDROCHLORIDE 36 MG/1
36 TABLET ORAL EVERY MORNING
COMMUNITY
Start: 2024-07-19

## 2024-10-04 RX ORDER — CYCLOBENZAPRINE HCL 10 MG
10 TABLET ORAL 3 TIMES DAILY PRN
Qty: 30 TABLET | Refills: 0 | Status: SHIPPED | OUTPATIENT
Start: 2024-10-04

## 2024-10-07 ENCOUNTER — LAB VISIT (OUTPATIENT)
Dept: LAB | Facility: HOSPITAL | Age: 19
End: 2024-10-07
Attending: FAMILY MEDICINE
Payer: COMMERCIAL

## 2024-10-07 DIAGNOSIS — Z00.00 ROUTINE GENERAL MEDICAL EXAMINATION AT A HEALTH CARE FACILITY: ICD-10-CM

## 2024-10-07 LAB
ALBUMIN SERPL BCP-MCNC: 4.5 G/DL (ref 3.5–5.2)
ALP SERPL-CCNC: 70 U/L (ref 55–135)
ALT SERPL W/O P-5'-P-CCNC: 12 U/L (ref 10–44)
ANION GAP SERPL CALC-SCNC: 10 MMOL/L (ref 8–16)
AST SERPL-CCNC: 14 U/L (ref 10–40)
BASOPHILS # BLD AUTO: 0.02 K/UL (ref 0–0.2)
BASOPHILS NFR BLD: 0.5 % (ref 0–1.9)
BILIRUB SERPL-MCNC: 0.6 MG/DL (ref 0.1–1)
BUN SERPL-MCNC: 8 MG/DL (ref 6–20)
CALCIUM SERPL-MCNC: 9.8 MG/DL (ref 8.7–10.5)
CHLORIDE SERPL-SCNC: 108 MMOL/L (ref 95–110)
CHOLEST SERPL-MCNC: 148 MG/DL (ref 120–199)
CHOLEST/HDLC SERPL: 3.3 {RATIO} (ref 2–5)
CO2 SERPL-SCNC: 23 MMOL/L (ref 23–29)
CREAT SERPL-MCNC: 0.8 MG/DL (ref 0.5–1.4)
DIFFERENTIAL METHOD BLD: ABNORMAL
EOSINOPHIL # BLD AUTO: 0.1 K/UL (ref 0–0.5)
EOSINOPHIL NFR BLD: 1.9 % (ref 0–8)
ERYTHROCYTE [DISTWIDTH] IN BLOOD BY AUTOMATED COUNT: 12.8 % (ref 11.5–14.5)
EST. GFR  (NO RACE VARIABLE): >60 ML/MIN/1.73 M^2
ESTIMATED AVG GLUCOSE: 82 MG/DL (ref 68–131)
GLUCOSE SERPL-MCNC: 79 MG/DL (ref 70–110)
HBA1C MFR BLD: 4.5 % (ref 4–5.6)
HCT VFR BLD AUTO: 42.8 % (ref 37–48.5)
HCV AB SERPL QL IA: NORMAL
HDLC SERPL-MCNC: 45 MG/DL (ref 40–75)
HDLC SERPL: 30.4 % (ref 20–50)
HGB BLD-MCNC: 14 G/DL (ref 12–16)
HIV 1+2 AB+HIV1 P24 AG SERPL QL IA: NORMAL
IMM GRANULOCYTES # BLD AUTO: 0.01 K/UL (ref 0–0.04)
IMM GRANULOCYTES NFR BLD AUTO: 0.2 % (ref 0–0.5)
LDLC SERPL CALC-MCNC: 90.4 MG/DL (ref 63–159)
LYMPHOCYTES # BLD AUTO: 1.5 K/UL (ref 1–4.8)
LYMPHOCYTES NFR BLD: 34.6 % (ref 18–48)
MCH RBC QN AUTO: 31.2 PG (ref 27–31)
MCHC RBC AUTO-ENTMCNC: 32.7 G/DL (ref 32–36)
MCV RBC AUTO: 95 FL (ref 82–98)
MONOCYTES # BLD AUTO: 0.3 K/UL (ref 0.3–1)
MONOCYTES NFR BLD: 8.1 % (ref 4–15)
NEUTROPHILS # BLD AUTO: 2.3 K/UL (ref 1.8–7.7)
NEUTROPHILS NFR BLD: 54.7 % (ref 38–73)
NONHDLC SERPL-MCNC: 103 MG/DL
NRBC BLD-RTO: 0 /100 WBC
PLATELET # BLD AUTO: 187 K/UL (ref 150–450)
PMV BLD AUTO: 10.9 FL (ref 9.2–12.9)
POTASSIUM SERPL-SCNC: 3.8 MMOL/L (ref 3.5–5.1)
PROT SERPL-MCNC: 7.5 G/DL (ref 6–8.4)
RBC # BLD AUTO: 4.49 M/UL (ref 4–5.4)
SODIUM SERPL-SCNC: 141 MMOL/L (ref 136–145)
T4 FREE SERPL-MCNC: 0.96 NG/DL (ref 0.71–1.51)
TRIGL SERPL-MCNC: 63 MG/DL (ref 30–150)
TSH SERPL DL<=0.005 MIU/L-ACNC: 0.48 UIU/ML (ref 0.4–4)
WBC # BLD AUTO: 4.19 K/UL (ref 3.9–12.7)

## 2024-10-07 PROCEDURE — 80053 COMPREHEN METABOLIC PANEL: CPT | Performed by: FAMILY MEDICINE

## 2024-10-07 PROCEDURE — 85025 COMPLETE CBC W/AUTO DIFF WBC: CPT | Performed by: FAMILY MEDICINE

## 2024-10-07 PROCEDURE — 83036 HEMOGLOBIN GLYCOSYLATED A1C: CPT | Performed by: FAMILY MEDICINE

## 2024-10-07 PROCEDURE — 84443 ASSAY THYROID STIM HORMONE: CPT | Performed by: FAMILY MEDICINE

## 2024-10-07 PROCEDURE — 87389 HIV-1 AG W/HIV-1&-2 AB AG IA: CPT | Performed by: FAMILY MEDICINE

## 2024-10-07 PROCEDURE — 80061 LIPID PANEL: CPT | Performed by: FAMILY MEDICINE

## 2024-10-07 PROCEDURE — 84439 ASSAY OF FREE THYROXINE: CPT | Performed by: FAMILY MEDICINE

## 2024-10-07 PROCEDURE — 86803 HEPATITIS C AB TEST: CPT | Performed by: FAMILY MEDICINE
